# Patient Record
Sex: FEMALE | Race: WHITE | Employment: OTHER | URBAN - METROPOLITAN AREA
[De-identification: names, ages, dates, MRNs, and addresses within clinical notes are randomized per-mention and may not be internally consistent; named-entity substitution may affect disease eponyms.]

---

## 2018-01-15 ENCOUNTER — TELEPHONE (OUTPATIENT)
Dept: FAMILY MEDICINE CLINIC | Facility: CLINIC | Age: 58
End: 2018-01-15

## 2018-01-15 NOTE — TELEPHONE ENCOUNTER
Pt scheduled her Pre-op on 3/6/18 w/Dr Alberta Woodall. Pt having Plastic Surgery w/Dr Alma Soto in [facility] on 3/21/18 (pt to either drop off or fax over orders) CBC,UA and EKG requiried

## 2018-01-22 ENCOUNTER — TELEPHONE (OUTPATIENT)
Dept: FAMILY MEDICINE CLINIC | Facility: CLINIC | Age: 58
End: 2018-01-22

## 2018-01-22 DIAGNOSIS — Z01.818 PRE-OP TESTING: Primary | ICD-10-CM

## 2018-01-22 NOTE — TELEPHONE ENCOUNTER
Patient dropped off requirements needed for her pre-op appointment 03/06/18 with Dr. Jane Boyd, form in triage

## 2018-01-22 NOTE — TELEPHONE ENCOUNTER
Appt made by front staff for Pt. Kal ordered labs, and notified Pt. EKG to be done at appt. Faxed letter sent to front staff to hold for Appt.

## 2018-03-01 LAB
ABSOLUTE BASOPHILS: 43 CELLS/UL (ref 0–200)
ABSOLUTE EOSINOPHILS: 172 CELLS/UL (ref 15–500)
ABSOLUTE LYMPHOCYTES: 2124 CELLS/UL (ref 850–3900)
ABSOLUTE MONOCYTES: 645 CELLS/UL (ref 200–950)
ABSOLUTE NEUTROPHILS: 5616 CELLS/UL (ref 1500–7800)
ALBUMIN/GLOBULIN RATIO: 1.9 (CALC) (ref 1–2.5)
ALBUMIN: 5 G/DL (ref 3.6–5.1)
ALKALINE PHOSPHATASE: 98 U/L (ref 33–130)
ALT: 8 U/L (ref 6–29)
APPEARANCE: CLEAR
AST: 21 U/L (ref 10–35)
BASOPHILS: 0.5 %
BILIRUBIN, TOTAL: 0.5 MG/DL (ref 0.2–1.2)
BILIRUBIN: NEGATIVE
BUN/CREATININE RATIO: 17 (CALC) (ref 6–22)
BUN: 19 MG/DL (ref 7–25)
CALCIUM: 10.4 MG/DL (ref 8.6–10.4)
CARBON DIOXIDE: 27 MMOL/L (ref 20–31)
CHLORIDE: 101 MMOL/L (ref 98–110)
CREATININE: 1.15 MG/DL (ref 0.5–1.05)
EGFR IF AFRICN AM: 61 ML/MIN/1.73M2
EGFR IF NONAFRICN AM: 52 ML/MIN/1.73M2
EOSINOPHILS: 2 %
GLOBULIN: 2.6 G/DL (CALC) (ref 1.9–3.7)
GLUCOSE: 84 MG/DL (ref 65–99)
GLUCOSE: NEGATIVE
HEMATOCRIT: 39.6 % (ref 35–45)
HEMOGLOBIN: 13.5 G/DL (ref 11.7–15.5)
LYMPHOCYTES: 24.7 %
MCH: 31 PG (ref 27–33)
MCHC: 34.1 G/DL (ref 32–36)
MCV: 91 FL (ref 80–100)
MONOCYTES: 7.5 %
MPV: 10.4 FL (ref 7.5–12.5)
NEUTROPHILS: 65.3 %
NITRITE: NEGATIVE
OCCULT BLOOD: NEGATIVE
PH: 6 (ref 5–8)
PLATELET COUNT: 380 THOUSAND/UL (ref 140–400)
POTASSIUM: 4.7 MMOL/L (ref 3.5–5.3)
PROTEIN, TOTAL: 7.6 G/DL (ref 6.1–8.1)
PROTEIN: NEGATIVE
RDW: 12.3 % (ref 11–15)
RED BLOOD CELL COUNT: 4.35 MILLION/UL (ref 3.8–5.1)
SODIUM: 137 MMOL/L (ref 135–146)
SPECIFIC GRAVITY: 1.01 (ref 1–1.03)
WHITE BLOOD CELL COUNT: 8.6 THOUSAND/UL (ref 3.8–10.8)

## 2018-03-06 ENCOUNTER — OFFICE VISIT (OUTPATIENT)
Dept: FAMILY MEDICINE CLINIC | Facility: CLINIC | Age: 58
End: 2018-03-06

## 2018-03-06 VITALS
WEIGHT: 162 LBS | HEART RATE: 60 BPM | RESPIRATION RATE: 16 BRPM | HEIGHT: 65.5 IN | BODY MASS INDEX: 26.67 KG/M2 | DIASTOLIC BLOOD PRESSURE: 70 MMHG | SYSTOLIC BLOOD PRESSURE: 110 MMHG

## 2018-03-06 DIAGNOSIS — R79.89 ELEVATED SERUM CREATININE: ICD-10-CM

## 2018-03-06 DIAGNOSIS — L65.9 HAIR LOSS: ICD-10-CM

## 2018-03-06 DIAGNOSIS — Z01.818 PREOP EXAMINATION: Primary | ICD-10-CM

## 2018-03-06 DIAGNOSIS — Z01.810 PREOP CARDIOVASCULAR EXAM: ICD-10-CM

## 2018-03-06 PROCEDURE — 99214 OFFICE O/P EST MOD 30 MIN: CPT | Performed by: FAMILY MEDICINE

## 2018-03-06 PROCEDURE — 93000 ELECTROCARDIOGRAM COMPLETE: CPT | Performed by: FAMILY MEDICINE

## 2018-03-06 RX ORDER — FLUTICASONE PROPIONATE 50 MCG
SPRAY, SUSPENSION (ML) NASAL
COMMUNITY
Start: 2018-02-20

## 2018-03-06 RX ORDER — BETAMETHASONE DIPROPIONATE 0.5 MG/ML
LOTION, AUGMENTED TOPICAL
Refills: 0 | COMMUNITY
Start: 2018-01-11

## 2018-03-06 NOTE — PROGRESS NOTES
Kasey Babb is a 62year old female who presents for a pre-operative physical exam. Patient is to have facial plastic surgery, to be done by Dr. Nic Wick in [facility] on 3/21/18. Pt has had previous anesthesia:  Yes.   Previous complicatio Heart Disease Paternal Grandfather    • Cancer Mother    • Heart Disease Paternal Grandmother    • Diabetes Father      DM   • High Cholesterol Father    • High Cholesterol Brother    • Stroke Paternal Uncle       Social History:   Smoking status: Never Sm

## 2018-08-02 ENCOUNTER — APPOINTMENT (OUTPATIENT)
Dept: GENERAL RADIOLOGY | Age: 58
End: 2018-08-02
Attending: PHYSICIAN ASSISTANT
Payer: COMMERCIAL

## 2018-08-02 ENCOUNTER — HOSPITAL ENCOUNTER (OUTPATIENT)
Age: 58
Discharge: HOME | End: 2018-08-02
Payer: COMMERCIAL

## 2018-08-02 VITALS
SYSTOLIC BLOOD PRESSURE: 125 MMHG | TEMPERATURE: 98 F | RESPIRATION RATE: 18 BRPM | HEART RATE: 68 BPM | DIASTOLIC BLOOD PRESSURE: 72 MMHG | OXYGEN SATURATION: 100 %

## 2018-08-02 DIAGNOSIS — R14.0 GASSINESS: Primary | ICD-10-CM

## 2018-08-02 LAB
BUN: 14 MG/DL (ref 7–25)
CALCIUM: 9.9 MG/DL (ref 8.6–10.4)
CARBON DIOXIDE: 27 MMOL/L (ref 20–31)
CHLORIDE: 100 MMOL/L (ref 98–110)
CREATININE: 0.89 MG/DL (ref 0.5–1.05)
EGFR IF AFRICN AM: 83 ML/MIN/1.73M2
EGFR IF NONAFRICN AM: 71 ML/MIN/1.73M2
GLUCOSE: 86 MG/DL (ref 65–99)
POCT BILIRUBIN URINE: NEGATIVE
POCT BLOOD URINE: NEGATIVE
POCT GLUCOSE URINE: NEGATIVE MG/DL
POCT KETONE URINE: NEGATIVE MG/DL
POCT NITRITE URINE: NEGATIVE
POCT PH URINE: 7 (ref 5–8)
POCT PROTEIN URINE: NEGATIVE MG/DL
POCT SPECIFIC GRAVITY URINE: 1.01
POCT URINE CLARITY: CLEAR
POCT URINE COLOR: YELLOW
POCT UROBILINOGEN URINE: 0.2 MG/DL
POTASSIUM: 4.5 MMOL/L (ref 3.5–5.3)
SODIUM: 136 MMOL/L (ref 135–146)

## 2018-08-02 PROCEDURE — 99214 OFFICE O/P EST MOD 30 MIN: CPT

## 2018-08-02 PROCEDURE — 99204 OFFICE O/P NEW MOD 45 MIN: CPT

## 2018-08-02 PROCEDURE — 74018 RADEX ABDOMEN 1 VIEW: CPT | Performed by: PHYSICIAN ASSISTANT

## 2018-08-02 PROCEDURE — 87086 URINE CULTURE/COLONY COUNT: CPT | Performed by: PHYSICIAN ASSISTANT

## 2018-08-02 PROCEDURE — 81002 URINALYSIS NONAUTO W/O SCOPE: CPT | Performed by: PHYSICIAN ASSISTANT

## 2018-08-02 RX ORDER — SIMETHICONE 125 MG
125 TABLET,CHEWABLE ORAL EVERY 6 HOURS PRN
Qty: 30 TABLET | Refills: 0 | Status: SHIPPED | OUTPATIENT
Start: 2018-08-02 | End: 2021-06-16

## 2018-08-02 NOTE — ED INITIAL ASSESSMENT (HPI)
C/O diffuse abdominal pain that started about 2 weeks ago. Sts she feels very gassy. Denies pain, just feels full. Last BM was yesterday and normal for her.

## 2018-08-03 NOTE — ED PROVIDER NOTES
Patient Seen in: [facility]    History   Patient presents with:  Abdominal Pain    Stated Complaint: ABD PAIN     HPI    CHIEF COMPLAINT: Gassiness     HISTORY OF PRESENT ILLNESS: Patient is a 19-year-old female with no signific history. Family history reviewed and is not pertinent to presenting problem.     Smoking status: Never Smoker                                                              Smokeless tobacco: Never Used                      Alcohol use: Yes           0.0 o following:        Result Value    Leukocyte esterase urine Small (*)     All other components within normal limits   URINE CULTURE, ROUTINE     Xr Abdomen (kub) (1 Ap View)  (cpt=74018)    Result Date: 8/2/2018  PROCEDURE:  XR ABDOMEN (KUB) (1 AP VIEW)  (C 26950  [phone]    In 1 week          Medications Prescribed:  Discharge Medication List as of 8/2/2018  6:37 PM    START taking these medications    simethicone 125 MG Oral Chew Tab  Chew 1 tablet (125 mg total) by mouth every 6 (six) hours as needed

## 2018-08-15 ENCOUNTER — HOSPITAL ENCOUNTER (OUTPATIENT)
Dept: BONE DENSITY | Age: 58
Discharge: HOME | End: 2018-08-15
Attending: OBSTETRICS & GYNECOLOGY
Payer: COMMERCIAL

## 2018-08-15 ENCOUNTER — HOSPITAL ENCOUNTER (OUTPATIENT)
Dept: MAMMOGRAPHY | Age: 58
Discharge: HOME | End: 2018-08-15
Attending: OBSTETRICS & GYNECOLOGY
Payer: COMMERCIAL

## 2018-08-15 DIAGNOSIS — Z12.31 ENCOUNTER FOR SCREENING MAMMOGRAM FOR MALIGNANT NEOPLASM OF BREAST: ICD-10-CM

## 2018-08-15 DIAGNOSIS — N95.1 SYMPTOMATIC MENOPAUSAL OR FEMALE CLIMACTERIC STATES: ICD-10-CM

## 2018-08-15 PROCEDURE — 77080 DXA BONE DENSITY AXIAL: CPT | Performed by: OBSTETRICS & GYNECOLOGY

## 2018-08-15 PROCEDURE — 77067 SCR MAMMO BI INCL CAD: CPT | Performed by: OBSTETRICS & GYNECOLOGY

## 2018-08-15 PROCEDURE — 77063 BREAST TOMOSYNTHESIS BI: CPT | Performed by: OBSTETRICS & GYNECOLOGY

## 2018-08-20 ENCOUNTER — OFFICE VISIT (OUTPATIENT)
Dept: FAMILY MEDICINE CLINIC | Facility: CLINIC | Age: 58
End: 2018-08-20
Payer: COMMERCIAL

## 2018-08-20 VITALS
HEIGHT: 66 IN | TEMPERATURE: 99 F | HEART RATE: 72 BPM | BODY MASS INDEX: 24.59 KG/M2 | RESPIRATION RATE: 16 BRPM | SYSTOLIC BLOOD PRESSURE: 96 MMHG | WEIGHT: 153 LBS | DIASTOLIC BLOOD PRESSURE: 60 MMHG

## 2018-08-20 DIAGNOSIS — R10.84 GENERALIZED ABDOMINAL PAIN: Primary | ICD-10-CM

## 2018-08-20 DIAGNOSIS — R79.89 ELEVATED SERUM CREATININE: ICD-10-CM

## 2018-08-20 DIAGNOSIS — R21 RASH: ICD-10-CM

## 2018-08-20 PROCEDURE — 99214 OFFICE O/P EST MOD 30 MIN: CPT | Performed by: FAMILY MEDICINE

## 2018-08-20 NOTE — PROGRESS NOTES
Eveline Bee is a 62year old female. HPI:   Went to [facility] with abdomen pain. Had normal UA and xr. Was given simethicone. Stopped taking her supplements 10 days ago. She is feeling a little better. Rash on chest x 1 week.  Changed soaps recen

## 2019-10-16 ENCOUNTER — HOSPITAL ENCOUNTER (OUTPATIENT)
Age: 59
Discharge: HOME | End: 2019-10-16
Payer: COMMERCIAL

## 2019-10-16 VITALS
OXYGEN SATURATION: 97 % | TEMPERATURE: 98 F | SYSTOLIC BLOOD PRESSURE: 113 MMHG | HEART RATE: 68 BPM | DIASTOLIC BLOOD PRESSURE: 60 MMHG | RESPIRATION RATE: 16 BRPM

## 2019-10-16 DIAGNOSIS — T22.211A: Primary | ICD-10-CM

## 2019-10-16 PROCEDURE — 99213 OFFICE O/P EST LOW 20 MIN: CPT

## 2019-10-16 PROCEDURE — 16020 DRESS/DEBRID P-THICK BURN S: CPT

## 2019-10-16 PROCEDURE — 90471 IMMUNIZATION ADMIN: CPT

## 2019-10-16 PROCEDURE — 16000 INITIAL TREATMENT OF BURN(S): CPT

## 2019-10-16 RX ORDER — OMEGA-3-ACID ETHYL ESTERS 1 G/1
1 CAPSULE, LIQUID FILLED ORAL DAILY
COMMUNITY
End: 2021-03-08

## 2019-10-16 RX ORDER — MINOXIDIL 2.5 MG/1
2.5 TABLET ORAL DAILY
COMMUNITY
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
## 2020-01-13 ENCOUNTER — HOSPITAL ENCOUNTER (OUTPATIENT)
Age: 60
Discharge: HOME | End: 2020-01-13
Attending: EMERGENCY MEDICINE
Payer: COMMERCIAL

## 2020-01-13 VITALS
RESPIRATION RATE: 20 BRPM | BODY MASS INDEX: 25.83 KG/M2 | DIASTOLIC BLOOD PRESSURE: 62 MMHG | WEIGHT: 155 LBS | HEIGHT: 65 IN | SYSTOLIC BLOOD PRESSURE: 126 MMHG | TEMPERATURE: 99 F | HEART RATE: 73 BPM | OXYGEN SATURATION: 97 %

## 2020-01-13 DIAGNOSIS — J01.00 ACUTE MAXILLARY SINUSITIS, RECURRENCE NOT SPECIFIED: Primary | ICD-10-CM

## 2020-01-13 PROCEDURE — 99214 OFFICE O/P EST MOD 30 MIN: CPT

## 2020-01-13 PROCEDURE — 99213 OFFICE O/P EST LOW 20 MIN: CPT

## 2020-01-13 RX ORDER — FLUTICASONE PROPIONATE 50 MCG
2 SPRAY, SUSPENSION (ML) NASAL DAILY
Qty: 16 G | Refills: 0 | Status: SHIPPED | OUTPATIENT
Start: 2020-01-13 | End: 2020-02-12

## 2020-01-13 RX ORDER — GUAIFENESIN 600 MG
1200 TABLET, EXTENDED RELEASE 12 HR ORAL 2 TIMES DAILY
Qty: 20 TABLET | Refills: 0 | Status: SHIPPED | OUTPATIENT
Start: 2020-01-13 | End: 2021-03-08

## 2020-01-13 RX ORDER — AMOXICILLIN AND CLAVULANATE POTASSIUM 875; 125 MG/1; MG/1
1 TABLET, FILM COATED ORAL 2 TIMES DAILY
Qty: 20 TABLET | Refills: 0 | Status: SHIPPED | OUTPATIENT
Start: 2020-01-13 | End: 2020-01-23

## 2020-01-13 NOTE — ED PROVIDER NOTES
Patient Seen in: [facility]      History   Patient presents with:  Cough/URI    Stated Complaint: c/o head cold for 3 wks    HPI    Head cold with congestion and she cannot blow out her nose for the last 3 weeks, she states it b are clear to auscultation heart has regular rate and rhythm    ED Course   Labs Reviewed - No data to display           MDM     Patient presents to the emergency department with 3 weeks of sinus congestion, being unable to blow her nose, I asked her to sto

## 2021-03-01 ENCOUNTER — HOSPITAL ENCOUNTER (OUTPATIENT)
Age: 61
Discharge: HOME | End: 2021-03-01
Attending: EMERGENCY MEDICINE
Payer: COMMERCIAL

## 2021-03-01 ENCOUNTER — APPOINTMENT (OUTPATIENT)
Dept: CT IMAGING | Age: 61
End: 2021-03-01
Attending: PHYSICIAN ASSISTANT
Payer: COMMERCIAL

## 2021-03-01 VITALS
TEMPERATURE: 97 F | SYSTOLIC BLOOD PRESSURE: 142 MMHG | HEART RATE: 82 BPM | OXYGEN SATURATION: 98 % | DIASTOLIC BLOOD PRESSURE: 75 MMHG | RESPIRATION RATE: 20 BRPM

## 2021-03-01 DIAGNOSIS — R10.9 ABDOMINAL PAIN, ACUTE: Primary | ICD-10-CM

## 2021-03-01 DIAGNOSIS — R93.2 ABNORMAL CT OF LIVER: ICD-10-CM

## 2021-03-01 LAB
#MXD IC: 0.5 X10ˆ3/UL (ref 0.1–1)
ALBUMIN SERPL-MCNC: 4.3 G/DL (ref 3.4–5)
ALP LIVER SERPL-CCNC: 136 U/L
ALT SERPL-CCNC: 19 U/L
AST SERPL-CCNC: 24 U/L (ref 15–37)
BILIRUB DIRECT SERPL-MCNC: 0.1 MG/DL (ref 0–0.2)
BILIRUB SERPL-MCNC: 0.5 MG/DL (ref 0.1–2)
CREAT BLD-MCNC: 0.9 MG/DL
GLUCOSE BLD-MCNC: 97 MG/DL (ref 70–99)
HCT VFR BLD AUTO: 43 %
HGB BLD-MCNC: 14.1 G/DL
ISTAT BUN: 20 MG/DL (ref 7–18)
ISTAT CHLORIDE: 102 MMOL/L (ref 98–112)
ISTAT HEMATOCRIT: 44 %
ISTAT IONIZED CALCIUM FOR CHEM 8: 1.18 MMOL/L (ref 1.12–1.32)
ISTAT POTASSIUM: 3.9 MMOL/L (ref 3.6–5.1)
ISTAT SODIUM: 138 MMOL/L (ref 136–145)
ISTAT TCO2: 27 MMOL/L (ref 21–32)
LIPASE SERPL-CCNC: 117 U/L (ref 73–393)
LYMPHOCYTES # BLD AUTO: 1.8 X10ˆ3/UL (ref 1–4)
LYMPHOCYTES NFR BLD AUTO: 26.7 %
M PROTEIN MFR SERPL ELPH: 8.2 G/DL (ref 6.4–8.2)
MCH RBC QN AUTO: 29.3 PG (ref 26–34)
MCHC RBC AUTO-ENTMCNC: 32.8 G/DL (ref 31–37)
MCV RBC AUTO: 89.2 FL (ref 80–100)
MIXED CELL %: 6.9 %
NEUTROPHILS # BLD AUTO: 4.3 X10ˆ3/UL (ref 1.5–7.7)
NEUTROPHILS NFR BLD AUTO: 66.4 %
PLATELET # BLD AUTO: 389 X10ˆ3/UL (ref 150–450)
POCT BILIRUBIN URINE: NEGATIVE
POCT BLOOD URINE: NEGATIVE
POCT GLUCOSE URINE: NEGATIVE MG/DL
POCT KETONE URINE: NEGATIVE MG/DL
POCT LEUKOCYTE ESTERASE URINE: NEGATIVE
POCT NITRITE URINE: NEGATIVE
POCT PH URINE: 6 (ref 5–8)
POCT PROTEIN URINE: NEGATIVE MG/DL
POCT SPECIFIC GRAVITY URINE: 1.02
POCT URINE CLARITY: CLEAR
POCT URINE COLOR: YELLOW
POCT UROBILINOGEN URINE: 0.2 MG/DL
RBC # BLD AUTO: 4.82 X10ˆ6/UL
WBC # BLD AUTO: 6.6 X10ˆ3/UL (ref 4–11)

## 2021-03-01 PROCEDURE — 85025 COMPLETE CBC W/AUTO DIFF WBC: CPT | Performed by: PHYSICIAN ASSISTANT

## 2021-03-01 PROCEDURE — 74177 CT ABD & PELVIS W/CONTRAST: CPT | Performed by: PHYSICIAN ASSISTANT

## 2021-03-01 PROCEDURE — 81002 URINALYSIS NONAUTO W/O SCOPE: CPT | Performed by: PHYSICIAN ASSISTANT

## 2021-03-01 PROCEDURE — 99215 OFFICE O/P EST HI 40 MIN: CPT

## 2021-03-01 PROCEDURE — 99214 OFFICE O/P EST MOD 30 MIN: CPT

## 2021-03-01 PROCEDURE — 36415 COLL VENOUS BLD VENIPUNCTURE: CPT

## 2021-03-01 PROCEDURE — 80047 BASIC METABLC PNL IONIZED CA: CPT

## 2021-03-01 PROCEDURE — 80076 HEPATIC FUNCTION PANEL: CPT | Performed by: PHYSICIAN ASSISTANT

## 2021-03-01 PROCEDURE — 83690 ASSAY OF LIPASE: CPT | Performed by: PHYSICIAN ASSISTANT

## 2021-03-01 RX ORDER — SIMETHICONE 125 MG
1 CAPSULE ORAL EVERY 6 HOURS PRN
Qty: 20 CAPSULE | Refills: 0 | Status: SHIPPED | OUTPATIENT
Start: 2021-03-01 | End: 2021-03-08

## 2021-03-01 NOTE — ED PROVIDER NOTES
Patient Seen in: [facility]      History   Patient presents with:  Abdominal Pain: Digestive issues - Entered by patient    Stated Complaint: Abdominal Pain - Digestive issues 1 wk    HPI/Subjective:   HPI    59-year-old female with a his sounds. Abdominal:      Palpations: Abdomen is soft. Tenderness: There is no abdominal tenderness. There is no guarding or rebound. Negative signs include Loco's sign and McBurney's sign. Musculoskeletal: Normal range of motion.    Skin:     Gene dissection. RETROPERITONEUM:  No mass or adenopathy. BOWEL/MESENTERY:  There is diverticulosis without evidence of acute diverticulitis. The appendix is normal. ABDOMINAL WALL:  No mass or hernia.   URINARY BLADDER:  No visible focal wall thickening, les testing methodology and agreed on plan of care.                      Disposition and Plan     Clinical Impression:  Abdominal pain, acute  (primary encounter diagnosis)  Abnormal CT of liver    Disposition:  Discharge  3/1/2021  7:31 pm    Follow-up:  [facility] Regular rate and rhythm, Heart sounds S1 S2 present, no murmurs, rubs or gallops

## 2021-03-01 NOTE — ED INITIAL ASSESSMENT (HPI)
Pt states she started feeling a \"knot\" to mid abd. Dull intermittent pain. Loose stools. Denies any n/v. Denies pain on palpation. Denies any abd pain at this time. Onset of symptoms started a week ago.    Diet is normal.

## 2021-03-02 NOTE — ED PROVIDER NOTES
Case discussed, agree with plan.   Recommend close follow-up with her primary care doctor and/or GI specialist.  Symptomatic treatment with light diet, plenty of fluids, over-the-counter Gas-X

## 2021-03-08 ENCOUNTER — OFFICE VISIT (OUTPATIENT)
Dept: FAMILY MEDICINE CLINIC | Facility: CLINIC | Age: 61
End: 2021-03-08
Payer: COMMERCIAL

## 2021-03-08 ENCOUNTER — TELEPHONE (OUTPATIENT)
Dept: FAMILY MEDICINE CLINIC | Facility: CLINIC | Age: 61
End: 2021-03-08

## 2021-03-08 VITALS
RESPIRATION RATE: 16 BRPM | TEMPERATURE: 97 F | DIASTOLIC BLOOD PRESSURE: 66 MMHG | BODY MASS INDEX: 29.62 KG/M2 | HEIGHT: 65 IN | WEIGHT: 177.81 LBS | SYSTOLIC BLOOD PRESSURE: 108 MMHG | HEART RATE: 94 BPM

## 2021-03-08 DIAGNOSIS — K76.9 LIVER LESION: Primary | ICD-10-CM

## 2021-03-08 DIAGNOSIS — R10.84 GENERALIZED ABDOMINAL PAIN: Primary | ICD-10-CM

## 2021-03-08 DIAGNOSIS — R14.0 ABDOMINAL BLOATING: ICD-10-CM

## 2021-03-08 DIAGNOSIS — K76.9 LIVER LESION: ICD-10-CM

## 2021-03-08 PROCEDURE — 3078F DIAST BP <80 MM HG: CPT | Performed by: FAMILY MEDICINE

## 2021-03-08 PROCEDURE — 99214 OFFICE O/P EST MOD 30 MIN: CPT | Performed by: FAMILY MEDICINE

## 2021-03-08 PROCEDURE — 3008F BODY MASS INDEX DOCD: CPT | Performed by: FAMILY MEDICINE

## 2021-03-08 PROCEDURE — 3074F SYST BP LT 130 MM HG: CPT | Performed by: FAMILY MEDICINE

## 2021-03-08 RX ORDER — OMEPRAZOLE 40 MG/1
40 CAPSULE, DELAYED RELEASE ORAL DAILY
Qty: 30 CAPSULE | Refills: 0 | Status: SHIPPED | OUTPATIENT
Start: 2021-03-08 | End: 2021-10-05

## 2021-03-08 NOTE — PROGRESS NOTES
Ruth Ann Cerrato is a 64year old female. HPI:   Patient went to [address] [place] [address] with abdominal pain on 3/1/21. Has had sx for 2 weeks. Worse in AM, better as day goes on  Increase gas/bloating. Passing a little flatus, minimal belching. More stress.   Hasn't auscultation  CARDIO: RRR without murmur  AB:  Soft, NT, ND, neg murphys    ASSESSMENT AND PLAN:   1. Generalized abdominal pain  2. Abdominal bloating  3. Liver lesion    Begin Omeprazole  Low fat, low carb diet. Can continue probiotic.    Plan for f/u MR

## 2021-03-08 NOTE — TELEPHONE ENCOUNTER
Pt had abnormal CT at immediate care.       Low-density in the lateral and medial segments of the left right lobe adjacent to the false form ligament may be due to mildly dilated biliary ducts in this region.  The common bile duct is unremarkable.  Possibil

## 2021-03-08 NOTE — TELEPHONE ENCOUNTER
Called Radiology Dept. MRI abdomen & MRCP with and without contrast recommended. Write \"include liver\" in comments.     Routed to Dr. Duarte Shishmaref - pended for review

## 2021-03-10 ENCOUNTER — TELEPHONE (OUTPATIENT)
Dept: FAMILY MEDICINE CLINIC | Facility: CLINIC | Age: 61
End: 2021-03-10

## 2021-03-10 ENCOUNTER — APPOINTMENT (OUTPATIENT)
Dept: CT IMAGING | Facility: HOSPITAL | Age: 61
End: 2021-03-10
Attending: STUDENT IN AN ORGANIZED HEALTH CARE EDUCATION/TRAINING PROGRAM
Payer: COMMERCIAL

## 2021-03-10 ENCOUNTER — HOSPITAL ENCOUNTER (EMERGENCY)
Facility: HOSPITAL | Age: 61
Discharge: HOME | End: 2021-03-10
Attending: STUDENT IN AN ORGANIZED HEALTH CARE EDUCATION/TRAINING PROGRAM
Payer: COMMERCIAL

## 2021-03-10 VITALS
RESPIRATION RATE: 21 BRPM | TEMPERATURE: 98 F | HEIGHT: 66 IN | DIASTOLIC BLOOD PRESSURE: 68 MMHG | SYSTOLIC BLOOD PRESSURE: 115 MMHG | OXYGEN SATURATION: 97 % | WEIGHT: 170 LBS | BODY MASS INDEX: 27.32 KG/M2 | HEART RATE: 63 BPM

## 2021-03-10 DIAGNOSIS — R06.00 DYSPNEA, UNSPECIFIED TYPE: ICD-10-CM

## 2021-03-10 DIAGNOSIS — D72.829 LEUKOCYTOSIS, UNSPECIFIED TYPE: ICD-10-CM

## 2021-03-10 DIAGNOSIS — E86.0 DEHYDRATION: ICD-10-CM

## 2021-03-10 DIAGNOSIS — R10.9 ABDOMINAL PAIN, ACUTE: Primary | ICD-10-CM

## 2021-03-10 LAB
ALBUMIN SERPL-MCNC: 4.4 G/DL (ref 3.4–5)
ALBUMIN/GLOB SERPL: 1 {RATIO} (ref 1–2)
ALP LIVER SERPL-CCNC: 155 U/L
ALT SERPL-CCNC: 16 U/L
ANION GAP SERPL CALC-SCNC: 9 MMOL/L (ref 0–18)
AST SERPL-CCNC: 21 U/L (ref 15–37)
ATRIAL RATE: 77 BPM
BASOPHILS # BLD AUTO: 0.04 X10(3) UL (ref 0–0.2)
BASOPHILS NFR BLD AUTO: 0.3 %
BILIRUB SERPL-MCNC: 0.7 MG/DL (ref 0.1–2)
BILIRUB UR QL STRIP.AUTO: NEGATIVE
BUN BLD-MCNC: 19 MG/DL (ref 7–18)
BUN/CREAT SERPL: 17.3 (ref 10–20)
CALCIUM BLD-MCNC: 10.1 MG/DL (ref 8.5–10.1)
CHLORIDE SERPL-SCNC: 106 MMOL/L (ref 98–112)
CLARITY UR REFRACT.AUTO: CLEAR
CO2 SERPL-SCNC: 23 MMOL/L (ref 21–32)
CREAT BLD-MCNC: 1.1 MG/DL
D-DIMER: 1.02 UG/ML FEU (ref ?–0.61)
DEPRECATED RDW RBC AUTO: 38.8 FL (ref 35.1–46.3)
EOSINOPHIL # BLD AUTO: 0.06 X10(3) UL (ref 0–0.7)
EOSINOPHIL NFR BLD AUTO: 0.5 %
ERYTHROCYTE [DISTWIDTH] IN BLOOD BY AUTOMATED COUNT: 12.7 % (ref 11–15)
GLOBULIN PLAS-MCNC: 4.3 G/DL (ref 2.8–4.4)
GLUCOSE BLD-MCNC: 108 MG/DL (ref 70–99)
GLUCOSE UR STRIP.AUTO-MCNC: NEGATIVE MG/DL
HCT VFR BLD AUTO: 44.2 %
HGB BLD-MCNC: 15.6 G/DL
IMM GRANULOCYTES # BLD AUTO: 0.05 X10(3) UL (ref 0–1)
IMM GRANULOCYTES NFR BLD: 0.4 %
KETONES UR STRIP.AUTO-MCNC: NEGATIVE MG/DL
LIPASE SERPL-CCNC: 142 U/L (ref 73–393)
LYMPHOCYTES # BLD AUTO: 1.41 X10(3) UL (ref 1–4)
LYMPHOCYTES NFR BLD AUTO: 11.1 %
M PROTEIN MFR SERPL ELPH: 8.7 G/DL (ref 6.4–8.2)
MCH RBC QN AUTO: 29.9 PG (ref 26–34)
MCHC RBC AUTO-ENTMCNC: 35.3 G/DL (ref 31–37)
MCV RBC AUTO: 84.8 FL
MONOCYTES # BLD AUTO: 0.58 X10(3) UL (ref 0.1–1)
MONOCYTES NFR BLD AUTO: 4.5 %
NEUTROPHILS # BLD AUTO: 10.61 X10 (3) UL (ref 1.5–7.7)
NEUTROPHILS # BLD AUTO: 10.61 X10(3) UL (ref 1.5–7.7)
NEUTROPHILS NFR BLD AUTO: 83.2 %
NITRITE UR QL STRIP.AUTO: NEGATIVE
OSMOLALITY SERPL CALC.SUM OF ELEC: 289 MOSM/KG (ref 275–295)
P AXIS: 59 DEGREES
P-R INTERVAL: 168 MS
PH UR STRIP.AUTO: 7 [PH] (ref 5–8)
PLATELET # BLD AUTO: 393 10(3)UL (ref 150–450)
POTASSIUM SERPL-SCNC: 3.6 MMOL/L (ref 3.5–5.1)
PROT UR STRIP.AUTO-MCNC: NEGATIVE MG/DL
Q-T INTERVAL: 370 MS
QRS DURATION: 78 MS
QTC CALCULATION (BEZET): 418 MS
R AXIS: -4 DEGREES
RBC # BLD AUTO: 5.21 X10(6)UL
RBC UR QL AUTO: NEGATIVE
SODIUM SERPL-SCNC: 138 MMOL/L (ref 136–145)
SP GR UR STRIP.AUTO: 1 (ref 1–1.03)
T AXIS: 43 DEGREES
TROPONIN I SERPL-MCNC: <0.045 NG/ML (ref ?–0.04)
UROBILINOGEN UR STRIP.AUTO-MCNC: <2 MG/DL
VENTRICULAR RATE: 77 BPM
WBC # BLD AUTO: 12.8 X10(3) UL (ref 4–11)

## 2021-03-10 PROCEDURE — 99285 EMERGENCY DEPT VISIT HI MDM: CPT

## 2021-03-10 PROCEDURE — 81001 URINALYSIS AUTO W/SCOPE: CPT | Performed by: STUDENT IN AN ORGANIZED HEALTH CARE EDUCATION/TRAINING PROGRAM

## 2021-03-10 PROCEDURE — 93010 ELECTROCARDIOGRAM REPORT: CPT

## 2021-03-10 PROCEDURE — 84484 ASSAY OF TROPONIN QUANT: CPT | Performed by: STUDENT IN AN ORGANIZED HEALTH CARE EDUCATION/TRAINING PROGRAM

## 2021-03-10 PROCEDURE — 80053 COMPREHEN METABOLIC PANEL: CPT | Performed by: STUDENT IN AN ORGANIZED HEALTH CARE EDUCATION/TRAINING PROGRAM

## 2021-03-10 PROCEDURE — 93005 ELECTROCARDIOGRAM TRACING: CPT

## 2021-03-10 PROCEDURE — 85025 COMPLETE CBC W/AUTO DIFF WBC: CPT | Performed by: STUDENT IN AN ORGANIZED HEALTH CARE EDUCATION/TRAINING PROGRAM

## 2021-03-10 PROCEDURE — 85379 FIBRIN DEGRADATION QUANT: CPT | Performed by: STUDENT IN AN ORGANIZED HEALTH CARE EDUCATION/TRAINING PROGRAM

## 2021-03-10 PROCEDURE — 71275 CT ANGIOGRAPHY CHEST: CPT | Performed by: STUDENT IN AN ORGANIZED HEALTH CARE EDUCATION/TRAINING PROGRAM

## 2021-03-10 PROCEDURE — 83690 ASSAY OF LIPASE: CPT | Performed by: STUDENT IN AN ORGANIZED HEALTH CARE EDUCATION/TRAINING PROGRAM

## 2021-03-10 PROCEDURE — 36415 COLL VENOUS BLD VENIPUNCTURE: CPT

## 2021-03-10 NOTE — ED INITIAL ASSESSMENT (HPI)
Pt c/o lower mid abdominal discomfort and gas. Pt went to IC and had CT done. Pts PCP wants pt to have a special test that is pending with insurance. Pt denies N/V/D. Pt also c/o some exertional SOB, denies cp.

## 2021-03-10 NOTE — ED PROVIDER NOTES
Patient Seen in: [facility] Emergency Department      History   Patient presents with:  Abdomen/Flank Pain    Stated Complaint: abd pain    HPI/Subjective:   HPI    Patient is a 51-year-old female who presents emergency department with abdominal dis 98.3 °F (36.8 °C)   Temp src Temporal   SpO2 95 %   O2 Device None (Room air)       Current:/68   Pulse 63   Temp 98.3 °F (36.8 °C) (Temporal)   Resp 21   Ht 167.6 cm (5' 6\")   Wt 77.1 kg   SpO2 97%   BMI 27.44 kg/m²         Physical Exam    Constit I - Normal   CBC WITH DIFFERENTIAL WITH PLATELET    Narrative: The following orders were created for panel order CBC WITH DIFFERENTIAL WITH PLATELET.   Procedure                               Abnormality         Status                     --------- received a phone call and was able to schedule her MRI/MRCP for tomorrow. Repeat abdominal examination reveals soft and nontender abdomen. I feel the patient can safely follow-up with her primary care physician.   She was advised on reasons to return to

## 2021-03-10 NOTE — TELEPHONE ENCOUNTER
Ultra fast heart scan [phone], called patient is currently in the ED for shortness of breath and will look into this when she gets out.

## 2021-03-11 ENCOUNTER — HOSPITAL ENCOUNTER (OUTPATIENT)
Dept: MRI IMAGING | Age: 61
Discharge: HOME | End: 2021-03-11
Attending: FAMILY MEDICINE
Payer: COMMERCIAL

## 2021-03-11 DIAGNOSIS — K76.9 LIVER LESION: ICD-10-CM

## 2021-03-11 PROCEDURE — 76376 3D RENDER W/INTRP POSTPROCES: CPT | Performed by: FAMILY MEDICINE

## 2021-03-11 PROCEDURE — A9581 GADOXETATE DISODIUM INJ: HCPCS | Performed by: FAMILY MEDICINE

## 2021-03-11 PROCEDURE — 74183 MRI ABD W/O CNTR FLWD CNTR: CPT | Performed by: FAMILY MEDICINE

## 2021-03-12 ENCOUNTER — TELEPHONE (OUTPATIENT)
Dept: FAMILY MEDICINE CLINIC | Facility: CLINIC | Age: 61
End: 2021-03-12

## 2021-03-12 NOTE — TELEPHONE ENCOUNTER
Reviewed MRI:  Benign appearing cyst in the liver  Atrophy (decrease in size) in an area of the liver with dilated duct. 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       I would li

## 2021-03-12 NOTE — TELEPHONE ENCOUNTER
Called and talked to patient and went over instructions from Dr Sheldon Moreno she will try this and see how she does.

## 2021-03-12 NOTE — TELEPHONE ENCOUNTER
Results reviewed with patient. She verbalizes understanding. She has already scheduled with Dr Roque Kirby. She reports the Omeprazole is not providing any relief and questions whether there is another medication she can try?     Routed to Dr. Courtney Hercules

## 2021-03-12 NOTE — TELEPHONE ENCOUNTER
It can take some time to notice an improvement. She was also given simethicone from the IC that she can use as needed. I also recommend low fat diet;  avoid acid, spicy, fried foods and caffeine and etoh.

## 2021-03-17 ENCOUNTER — HOSPITAL ENCOUNTER (OUTPATIENT)
Dept: MAMMOGRAPHY | Age: 61
Discharge: HOME | End: 2021-03-17
Attending: NURSE PRACTITIONER
Payer: COMMERCIAL

## 2021-03-17 DIAGNOSIS — Z12.31 VISIT FOR SCREENING MAMMOGRAM: ICD-10-CM

## 2021-03-17 PROCEDURE — 77063 BREAST TOMOSYNTHESIS BI: CPT | Performed by: NURSE PRACTITIONER

## 2021-03-17 PROCEDURE — 77067 SCR MAMMO BI INCL CAD: CPT | Performed by: NURSE PRACTITIONER

## 2021-03-19 ENCOUNTER — HOSPITAL ENCOUNTER (EMERGENCY)
Facility: HOSPITAL | Age: 61
Discharge: HOME | End: 2021-03-19
Attending: EMERGENCY MEDICINE
Payer: COMMERCIAL

## 2021-03-19 ENCOUNTER — PATIENT MESSAGE (OUTPATIENT)
Dept: FAMILY MEDICINE CLINIC | Facility: CLINIC | Age: 61
End: 2021-03-19

## 2021-03-19 VITALS
TEMPERATURE: 98 F | HEIGHT: 66 IN | WEIGHT: 165 LBS | DIASTOLIC BLOOD PRESSURE: 62 MMHG | OXYGEN SATURATION: 98 % | RESPIRATION RATE: 23 BRPM | BODY MASS INDEX: 26.52 KG/M2 | HEART RATE: 63 BPM | SYSTOLIC BLOOD PRESSURE: 121 MMHG

## 2021-03-19 DIAGNOSIS — T78.3XXA ANGIOEDEMA OF LIPS, INITIAL ENCOUNTER: Primary | ICD-10-CM

## 2021-03-19 DIAGNOSIS — K76.9 LIVER LESION: ICD-10-CM

## 2021-03-19 DIAGNOSIS — R10.84 GENERALIZED ABDOMINAL PAIN: Primary | ICD-10-CM

## 2021-03-19 PROCEDURE — S0028 INJECTION, FAMOTIDINE, 20 MG: HCPCS

## 2021-03-19 PROCEDURE — 99284 EMERGENCY DEPT VISIT MOD MDM: CPT

## 2021-03-19 PROCEDURE — 96374 THER/PROPH/DIAG INJ IV PUSH: CPT

## 2021-03-19 PROCEDURE — 96375 TX/PRO/DX INJ NEW DRUG ADDON: CPT

## 2021-03-19 PROCEDURE — 96361 HYDRATE IV INFUSION ADD-ON: CPT

## 2021-03-19 RX ORDER — FAMOTIDINE 10 MG/ML
INJECTION, SOLUTION INTRAVENOUS
Status: COMPLETED
Start: 2021-03-19 | End: 2021-03-19

## 2021-03-19 RX ORDER — DIPHENHYDRAMINE HYDROCHLORIDE 50 MG/ML
50 INJECTION INTRAMUSCULAR; INTRAVENOUS ONCE
Status: COMPLETED | OUTPATIENT
Start: 2021-03-19 | End: 2021-03-19

## 2021-03-19 RX ORDER — DIPHENHYDRAMINE HCL 50 MG
50 CAPSULE ORAL ONCE
Status: DISCONTINUED | OUTPATIENT
Start: 2021-03-19 | End: 2021-03-19

## 2021-03-19 RX ORDER — METHYLPREDNISOLONE SODIUM SUCCINATE 125 MG/2ML
125 INJECTION, POWDER, LYOPHILIZED, FOR SOLUTION INTRAMUSCULAR; INTRAVENOUS ONCE
Status: COMPLETED | OUTPATIENT
Start: 2021-03-19 | End: 2021-03-19

## 2021-03-19 RX ORDER — FAMOTIDINE 10 MG/ML
20 INJECTION, SOLUTION INTRAVENOUS ONCE
Status: COMPLETED | OUTPATIENT
Start: 2021-03-19 | End: 2021-03-19

## 2021-03-19 RX ORDER — DIPHENHYDRAMINE HYDROCHLORIDE 50 MG/ML
INJECTION INTRAMUSCULAR; INTRAVENOUS
Status: COMPLETED
Start: 2021-03-19 | End: 2021-03-19

## 2021-03-19 RX ORDER — METHYLPREDNISOLONE SODIUM SUCCINATE 125 MG/2ML
INJECTION, POWDER, LYOPHILIZED, FOR SOLUTION INTRAMUSCULAR; INTRAVENOUS
Status: COMPLETED
Start: 2021-03-19 | End: 2021-03-19

## 2021-03-19 NOTE — TELEPHONE ENCOUNTER
From: Polo Escobar  To: Breanna Aquino MD  Sent: 3/19/2021 3:25 PM CDT  Subject: Referral Request    Jose Luis Hamilton,    I am planning on seeing [facility]. [facility] needs a referral from your office.  [facility]’s phone number is [phone]

## 2021-03-19 NOTE — ED PROVIDER NOTES
Patient Seen in: [facility] Emergency Department      History   Patient presents with:   Allergic Rxn Allergies    Stated Complaint: pt arrives with puffy lips, pt started new medication, no marshall or swollen tongue     HPI/Subjective:   HPI    61-year- equipment including droplet mask, eye protection, and gloves were worn throughout the duration of the exam.  Handwashing was performed prior to and after the exam.  Stethoscope and any equipment used during my examination was cleaned with super sani-cloth life threatening deterioration of the patient's clinical status.   The patient required my time at the bedside performing direct personal management, the absence of which would likely result in sudden, clinically significant or life threatening deterioratio

## 2021-03-19 NOTE — ED INITIAL ASSESSMENT (HPI)
Pt reports taking new medications, last night at 7pm, reports she noticed swelling in her lips around 11pm. Pt states she took benadryl around 2am. No shortness of breath, managing secretions. Noted swollen lips.

## 2021-03-19 NOTE — TELEPHONE ENCOUNTER
Was referred to [facility]. I'm assuming patient choosing to f/u at [facility].   Can forward to Kishore Blake for referral

## 2021-03-19 NOTE — ED NOTES
New medications pt took are,    PepZinGi (Zinc-L-Camosine Complex)  [place] herbal Supplement  DGL - Deglycyrrhizinated Licorice root extract

## 2021-03-19 NOTE — TELEPHONE ENCOUNTER
Do you know anything about Pt needing to see a GI specialist at [facility]?

## 2021-03-22 NOTE — TELEPHONE ENCOUNTER
Referral request [facility]  Fax number: [phone]    Office notes from ER visit 3/10/2021  Patient is a 14-year-old female who presents emergency department with abdominal discomfort of 2 weeks duration and difficulty breathing since yesterday

## 2021-04-22 ENCOUNTER — TELEPHONE (OUTPATIENT)
Dept: FAMILY MEDICINE CLINIC | Facility: CLINIC | Age: 61
End: 2021-04-22

## 2021-04-22 NOTE — TELEPHONE ENCOUNTER
Patient called back I asked her why she would like this done as she just had colonoscopy in 2016 and is not due until 2026. She said it has been 5 years and if she wants to do this who a, I to question her.  I explained that insurance might not cover this b

## 2021-04-22 NOTE — TELEPHONE ENCOUNTER
Called LMJUAN LUIS to call back to see why she wanted to do cologard had colonoscopy 3/07/2016 that said to repeat it in 2026.

## 2021-05-17 ENCOUNTER — TELEPHONE (OUTPATIENT)
Dept: FAMILY MEDICINE CLINIC | Facility: CLINIC | Age: 61
End: 2021-05-17

## 2021-05-17 NOTE — TELEPHONE ENCOUNTER
Pt is calling for her Cologuard test results. She said it has been over a month. Please call to advise.

## 2021-06-16 ENCOUNTER — TELEPHONE (OUTPATIENT)
Dept: FAMILY MEDICINE CLINIC | Facility: CLINIC | Age: 61
End: 2021-06-16

## 2021-06-16 RX ORDER — TOBRAMYCIN AND DEXAMETHASONE 3; 1 MG/ML; MG/ML
SUSPENSION/ DROPS OPHTHALMIC
COMMUNITY
Start: 2021-06-03 | End: 2021-10-05

## 2021-06-16 NOTE — TELEPHONE ENCOUNTER
Had surgery and has bubble patch over eye and need to rest. It will take 1 year for it to completely heal She feels very anxious about this would like something for anxiety.  Made an appointment with Carlee Klinefelter PA for tomorrow at 3 pm.

## 2021-06-16 NOTE — TELEPHONE ENCOUNTER
Patient is requesting Anxiety medication. She just had Retina Surgery 12 days ago and fills like she's needs something ASAP . Patient was very impatience and sounded nervous about when we would get back to her.

## 2021-06-18 ENCOUNTER — TELEPHONE (OUTPATIENT)
Dept: FAMILY MEDICINE CLINIC | Facility: CLINIC | Age: 61
End: 2021-06-18

## 2021-06-18 NOTE — TELEPHONE ENCOUNTER
Medications reviewed with patient. She verbalized understanding. Patient has no further questions or concerns at this time.

## 2021-06-18 NOTE — TELEPHONE ENCOUNTER
Patient has questions about her   Meclizine HCl 25 MG Oral Tab that was given to her yesterday she is requesting a call back ASAP.

## 2021-06-21 ENCOUNTER — TELEPHONE (OUTPATIENT)
Dept: FAMILY MEDICINE CLINIC | Facility: CLINIC | Age: 61
End: 2021-06-21

## 2021-06-21 NOTE — TELEPHONE ENCOUNTER
Pt states Leda Valdes prescribed her Meclizine/ escitalopram/  ALPRAZolam pt states wants to know why they were prescribed to her.

## 2021-06-21 NOTE — TELEPHONE ENCOUNTER
Patient states meclizine is not working for her. It takes several hours after dose to feel any relief. She is asking if there are any alternatives she can try?     Routed to Horace Sainz PA-C

## 2021-06-22 NOTE — PROGRESS NOTES
Patient presents with:   Anxiety: looking for medication for anxiety cause from post retinal surgery   Dizziness: requesting medication for dizziness/light headed        HISTORY OF PRESENT ILLNESS  Codie Domingo is a 64year old female who presents for e Disp: , Rfl:   •  Nutritional Supplements (VITAMIN D BOOSTER OR), Take  by mouth., Disp: , Rfl:   •  Spironolactone 100 MG Oral Tab, Take 100 mg by mouth daily. , Disp: , Rfl:     Patient has no known allergies.     Patient Active Problem List    Unspecified

## 2021-06-24 NOTE — TELEPHONE ENCOUNTER
Pt states no improvement with dizziness- taking the Meclizine daily and will occ take a 2nd dose. Pt would like to know if there is an alternate med that may help more? Pt concerned about the Lexapro and possible side effects.  Pt saw a commercial and t

## 2021-06-24 NOTE — TELEPHONE ENCOUNTER
Tardive dyskinesia is more related to taking numerous medications that affect serotonin. Not a concern for her. Can increase meclizine number of times per day to try taking 2-3 to see if helpful.  OK to use CBD oil if helpful, not a lot of studies so cannot

## 2021-06-24 NOTE — TELEPHONE ENCOUNTER
Patient called back we went over what she wanted to discuss about TD and medications suggested she take meclizine.  3 times a day and wait for medication to start working she feels it might be getting better

## 2021-06-29 ENCOUNTER — TELEPHONE (OUTPATIENT)
Dept: FAMILY MEDICINE CLINIC | Facility: CLINIC | Age: 61
End: 2021-06-29

## 2021-06-29 NOTE — TELEPHONE ENCOUNTER
Pt requesting for a call back Laci Fernandes (nurse) to see if she can take a supplement Lutin. No further information given.

## 2021-06-29 NOTE — TELEPHONE ENCOUNTER
Patient would like to take Lutein for eye health. Any objections?     Routed to Dr. Cristina Flaherty

## 2021-07-02 ENCOUNTER — TELEPHONE (OUTPATIENT)
Dept: FAMILY MEDICINE CLINIC | Facility: CLINIC | Age: 61
End: 2021-07-02

## 2021-07-02 NOTE — TELEPHONE ENCOUNTER
Common side effects of Lexapro include:  drowsiness,   dizziness,   sleep problems (insomnia),   nausea,   upset stomach,   gas,   heartburn,   constipation,   weight changes,   dry mouth,   yawning,   ringing in the ears,   decreased sex drive,   impotenc

## 2021-07-02 NOTE — TELEPHONE ENCOUNTER
Patient has questions about the side effects of Lexapro, had heart palpitation about an hour ago, not sure if it's related or not, just started this medication 2 weeks ago

## 2021-07-06 ENCOUNTER — TELEPHONE (OUTPATIENT)
Dept: FAMILY MEDICINE CLINIC | Facility: CLINIC | Age: 61
End: 2021-07-06

## 2021-07-06 NOTE — TELEPHONE ENCOUNTER
LOV 6/17/2021 and at that time, pt was started on Lexapro 10 mg. She states that she started having palpitations and talked to Susana Lisa on 7/2/2021. Pt had only been taking Lexapro for 15 days at that time.  She  was advised to take Lexapro one half table

## 2021-07-06 NOTE — TELEPHONE ENCOUNTER
Please read previous message. Pt states that she feels really bad_ hot flashes and \" gassy stomach\" She has been off  Of Lexapro for 4 days.     Re routed to [facility] priority

## 2021-07-07 ENCOUNTER — TELEPHONE (OUTPATIENT)
Dept: FAMILY MEDICINE CLINIC | Facility: CLINIC | Age: 61
End: 2021-07-07

## 2021-07-07 NOTE — TELEPHONE ENCOUNTER
Called and talked to patient she went off lexapro and became nauseous so she went back in it 5 mg daily for 3 days still had some nausea so started taking gas X and if helped with the nausea so discussed when the 3 days are over she could do every other ca

## 2021-07-07 NOTE — TELEPHONE ENCOUNTER
Patient is calling requesting a nurse call FATOUMATA@ [phone] she is trying to make sure she can take a medication with her escitalopram 10 MG Oral Tab

## 2021-08-19 ENCOUNTER — TELEPHONE (OUTPATIENT)
Dept: FAMILY MEDICINE CLINIC | Facility: CLINIC | Age: 61
End: 2021-08-19

## 2021-08-19 NOTE — TELEPHONE ENCOUNTER
Pt requesting a call back from Dr. Steve Gibbons nurse regarding labs she had done at another office. No further information given.

## 2021-08-26 ENCOUNTER — TELEPHONE (OUTPATIENT)
Dept: FAMILY MEDICINE CLINIC | Facility: CLINIC | Age: 61
End: 2021-08-26

## 2021-08-26 NOTE — TELEPHONE ENCOUNTER
C/o stiff neck and feeling light headed feels walk is off center, went to PT and felt great but had to stop due to retinal surgery so stopped PT, has now resumed therapy and her neck is stiff and she feels the neck might be a problem so she would like to g

## 2021-10-01 ENCOUNTER — ORDER TRANSCRIPTION (OUTPATIENT)
Dept: PHYSICAL THERAPY | Facility: HOSPITAL | Age: 61
End: 2021-10-01

## 2021-10-01 DIAGNOSIS — M41.9 SCOLIOSIS: Primary | ICD-10-CM

## 2021-10-03 ENCOUNTER — APPOINTMENT (OUTPATIENT)
Dept: GENERAL RADIOLOGY | Facility: HOSPITAL | Age: 61
End: 2021-10-03
Attending: EMERGENCY MEDICINE
Payer: COMMERCIAL

## 2021-10-03 ENCOUNTER — HOSPITAL ENCOUNTER (EMERGENCY)
Facility: HOSPITAL | Age: 61
Discharge: HOME | End: 2021-10-03
Attending: EMERGENCY MEDICINE
Payer: COMMERCIAL

## 2021-10-03 VITALS
DIASTOLIC BLOOD PRESSURE: 78 MMHG | HEIGHT: 66 IN | TEMPERATURE: 97 F | HEART RATE: 60 BPM | SYSTOLIC BLOOD PRESSURE: 116 MMHG | RESPIRATION RATE: 18 BRPM | BODY MASS INDEX: 25 KG/M2 | OXYGEN SATURATION: 99 %

## 2021-10-03 DIAGNOSIS — M54.50 ACUTE LOW BACK PAIN, UNSPECIFIED BACK PAIN LATERALITY, UNSPECIFIED WHETHER SCIATICA PRESENT: Primary | ICD-10-CM

## 2021-10-03 PROCEDURE — 72110 X-RAY EXAM L-2 SPINE 4/>VWS: CPT | Performed by: EMERGENCY MEDICINE

## 2021-10-03 PROCEDURE — 99283 EMERGENCY DEPT VISIT LOW MDM: CPT

## 2021-10-03 PROCEDURE — 99284 EMERGENCY DEPT VISIT MOD MDM: CPT

## 2021-10-03 RX ORDER — MECLIZINE HYDROCHLORIDE 25 MG/1
25 TABLET ORAL ONCE
Status: COMPLETED | OUTPATIENT
Start: 2021-10-03 | End: 2021-10-03

## 2021-10-03 RX ORDER — CYCLOBENZAPRINE HCL 10 MG
10 TABLET ORAL 3 TIMES DAILY PRN
Qty: 20 TABLET | Refills: 0 | Status: SHIPPED | OUTPATIENT
Start: 2021-10-03 | End: 2021-10-07

## 2021-10-03 RX ORDER — MECLIZINE HYDROCHLORIDE 25 MG/1
25 TABLET ORAL 3 TIMES DAILY PRN
Qty: 20 TABLET | Refills: 0 | Status: SHIPPED | OUTPATIENT
Start: 2021-10-03

## 2021-10-03 RX ORDER — HYDROCODONE BITARTRATE AND ACETAMINOPHEN 5; 325 MG/1; MG/1
2 TABLET ORAL ONCE
Status: COMPLETED | OUTPATIENT
Start: 2021-10-03 | End: 2021-10-03

## 2021-10-03 RX ORDER — LOTEPREDNOL ETABONATE 5 MG/ML
1 SUSPENSION/ DROPS OPHTHALMIC 4 TIMES DAILY
COMMUNITY
End: 2021-11-30

## 2021-10-03 NOTE — ED PROVIDER NOTES
Patient Seen in: [facility] Emergency Department      History   Patient presents with:  Back Pain    Stated Complaint: back pain for about 6 weeks and feels off balance    Subjective:   HPI    Patient is a 30-year-old female comes emergency room for 111/87   Pulse 55   Temp 97.1 °F (36.2 °C) (Temporal)   Resp 20   Ht 167.6 cm (5' 6\")   SpO2 97%   BMI 24.86 kg/m²         Physical Exam    GENERAL: No acute distress, Well appearing and non-toxic, Alert and oriented X 3   HEENT: Normocephalic, atraumatic disease. No acute displaced osseous fracture is identified. The vertebral body heights are maintained. CONCLUSION:  Mild to moderate degenerative changes in the lumbar spine as above.    Dictated by (CST): Ady Glass MD on 10/03/2021 at [facility] [address].  [phone]    In 1 week            Medications Prescribed:  Current Discharge Medication List    START taking these medications    !! meclizine 25 MG Oral Tab  Take 1 tablet (25 mg total) by mouth 3 (three) times daily as needed

## 2021-10-03 NOTE — ED INITIAL ASSESSMENT (HPI)
Retinal detachment surgery and had to do posturing for 10 days, history of scoliosis, been seeing PT, massage and chiropractor adjusted x2 and utilized a neck stretching device.

## 2021-10-04 ENCOUNTER — TELEPHONE (OUTPATIENT)
Dept: FAMILY MEDICINE CLINIC | Facility: CLINIC | Age: 61
End: 2021-10-04

## 2021-10-04 NOTE — TELEPHONE ENCOUNTER
Patient requesting prescription for Standard [place].  Was seen in ED yesterday for low back pain. Declined pain medication at the time of visit.     Routed to Dr. Mami Castellon

## 2021-10-04 NOTE — TELEPHONE ENCOUNTER
Pt is in extreme pain and the medication they gave her in the ER last night is not working. Pain in lower back. She is requesting another medication for pain. The Cyclobenzaprine 10 mg is not working.

## 2021-10-04 NOTE — TELEPHONE ENCOUNTER
Patient came into office, here now, states the hydrocodone they gave her while she was in the hospital worked, feels a prescription for that might work for her.  Declined it at first because she felt off balance when she first took it but after the initial

## 2021-10-05 ENCOUNTER — OFFICE VISIT (OUTPATIENT)
Dept: FAMILY MEDICINE CLINIC | Facility: CLINIC | Age: 61
End: 2021-10-05
Payer: COMMERCIAL

## 2021-10-05 ENCOUNTER — TELEPHONE (OUTPATIENT)
Dept: FAMILY MEDICINE CLINIC | Facility: CLINIC | Age: 61
End: 2021-10-05

## 2021-10-05 ENCOUNTER — TELEPHONE (OUTPATIENT)
Dept: PHYSICAL THERAPY | Facility: HOSPITAL | Age: 61
End: 2021-10-05

## 2021-10-05 ENCOUNTER — OFFICE VISIT (OUTPATIENT)
Dept: PHYSICAL THERAPY | Age: 61
End: 2021-10-05
Attending: FAMILY MEDICINE
Payer: COMMERCIAL

## 2021-10-05 VITALS
DIASTOLIC BLOOD PRESSURE: 62 MMHG | SYSTOLIC BLOOD PRESSURE: 108 MMHG | HEART RATE: 75 BPM | RESPIRATION RATE: 16 BRPM | WEIGHT: 149 LBS | BODY MASS INDEX: 23.67 KG/M2 | TEMPERATURE: 97 F | HEIGHT: 66.5 IN

## 2021-10-05 DIAGNOSIS — M54.50 CHRONIC BILATERAL LOW BACK PAIN WITHOUT SCIATICA: ICD-10-CM

## 2021-10-05 DIAGNOSIS — M41.9 SCOLIOSIS, UNSPECIFIED SCOLIOSIS TYPE, UNSPECIFIED SPINAL REGION: Primary | ICD-10-CM

## 2021-10-05 DIAGNOSIS — G89.29 CHRONIC BILATERAL LOW BACK PAIN WITHOUT SCIATICA: ICD-10-CM

## 2021-10-05 DIAGNOSIS — M54.2 CERVICALGIA: ICD-10-CM

## 2021-10-05 PROCEDURE — 97161 PT EVAL LOW COMPLEX 20 MIN: CPT

## 2021-10-05 PROCEDURE — 99214 OFFICE O/P EST MOD 30 MIN: CPT | Performed by: PHYSICIAN ASSISTANT

## 2021-10-05 PROCEDURE — 3078F DIAST BP <80 MM HG: CPT | Performed by: PHYSICIAN ASSISTANT

## 2021-10-05 PROCEDURE — 97110 THERAPEUTIC EXERCISES: CPT

## 2021-10-05 PROCEDURE — 3008F BODY MASS INDEX DOCD: CPT | Performed by: PHYSICIAN ASSISTANT

## 2021-10-05 PROCEDURE — 3074F SYST BP LT 130 MM HG: CPT | Performed by: PHYSICIAN ASSISTANT

## 2021-10-05 RX ORDER — ERYTHROMYCIN 5 MG/G
OINTMENT OPHTHALMIC
COMMUNITY
Start: 2021-09-02

## 2021-10-05 NOTE — TELEPHONE ENCOUNTER
Pt would like to know if MRI should be done for whole spine and not just top and bottom. Pt would like an answer promptly so she is able to schedule her appt. Soon.

## 2021-10-05 NOTE — PROGRESS NOTES
SPINE EVALUATION:   Referring Physician: Dr. Sharon De La O  Diagnosis: Scoliosis (M41.9) Date of Service: 10/5/2021     PATIENT SUMMARY   Alexis Medina is a 64year old y/o female who presents to therapy today with complaints of lower back pain, neck pain a balance with walking. Hussein Santiago describes prior level of function: prior to June she had no back issues or pain or feeling of off balance, she did not feel the tightness in her neck.      Pt goals include: get rid of the pain, try to improve her equilibrium prominences associated with rotary component    Gait: good gait mechanics    Balance: SLS R 10 sec, L 10 sec  *pt able to sustain good balance although note moderate ankle/knee strategy    ROM:     Cervical           Flexion WFL   Extension WFL   R Sideben cervical support, review of current HEP exercises and instruction on avoiding cervical compensation  Patient was instructed in and issued a HEP for: may continue with current exercise regimen although focus on avoiding compensation patterns, focus on breat co-sign or sign and return this letter via fax as soon as possible to [phone].  If you have any questions, please contact me at Dept: [phone]    Sincerely,  Electronically signed by therapist: Nila New, PT,DPT,SB-C2    Physician's certific

## 2021-10-05 NOTE — PROGRESS NOTES
Patient presents with:  ER F/U: D/C10/3 acute low back pain       HISTORY OF PRESENT ILLNESS  Wilmot Gitelman is a 64year old female who presents for back pain. Notes that she has had ongoing upper and lower back pain for quite some time.   She does feel Vitamins-Minerals (MULTIVITAMIN ADULT OR), Take by mouth., Disp: , Rfl:   •  Probiotic Product (PROBIOTIC DAILY OR), Take by mouth. Takes probium  , Disp: , Rfl:   •  loratadine (CLARITIN) 10 MG Oral Tab, Take 10 mg by mouth daily.  Takes morning and evenin Cervicalgia  Plan: Already has appt set up with ortho spine- needs imaging before she sees them. Suspect most is muscular pain. Encouraged her to keep up with heat, gentle exercises. Try to change sleep position and monitoring sitting/ shoulder position.  Emmanuel Hernandez

## 2021-10-07 ENCOUNTER — TELEPHONE (OUTPATIENT)
Dept: FAMILY MEDICINE CLINIC | Facility: CLINIC | Age: 61
End: 2021-10-07

## 2021-10-07 RX ORDER — METHOCARBAMOL 750 MG/1
750 TABLET, FILM COATED ORAL 3 TIMES DAILY PRN
Qty: 30 TABLET | Refills: 0 | Status: SHIPPED | OUTPATIENT
Start: 2021-10-07

## 2021-10-07 NOTE — TELEPHONE ENCOUNTER
Pt states she was prescribed cyclobenzaprine due to her back pain. Pt sates she was in on 10/05/21 to see Xavier Amaya regarding pain. Pt states Antoinette Francois advised pt to keep taking medication and if did not work to request a new medication.  Pt states she is

## 2021-10-08 ENCOUNTER — OFFICE VISIT (OUTPATIENT)
Dept: PHYSICAL THERAPY | Age: 61
End: 2021-10-08
Attending: FAMILY MEDICINE
Payer: COMMERCIAL

## 2021-10-08 PROCEDURE — 97140 MANUAL THERAPY 1/> REGIONS: CPT

## 2021-10-08 PROCEDURE — 97110 THERAPEUTIC EXERCISES: CPT

## 2021-10-08 PROCEDURE — 97112 NEUROMUSCULAR REEDUCATION: CPT

## 2021-10-08 NOTE — PROGRESS NOTES
Diagnosis: Scoliosis (M41.9)  Insurance (Authorized # of Visits):  [facility] PPO (pt with now 6 visits left for calendar year)      Authorizing Physician: Dr. Roseline Alvarez  Next MD visit: none scheduled  Fall Risk: standard         Precautions: n/a           Florrie Erm in feeling \"off balance\" - in progress  5. Pt will demo improved breathing mechanics with improved diaphragmatic expansion and lateral costal expansion, and decreased upper chest compensation - in progress       Date: 10/8/21  Tx #: 2 Date:   Tx#: Date:

## 2021-10-11 ENCOUNTER — PATIENT MESSAGE (OUTPATIENT)
Dept: FAMILY MEDICINE CLINIC | Facility: CLINIC | Age: 61
End: 2021-10-11

## 2021-10-11 NOTE — TELEPHONE ENCOUNTER
From: Timmie Dancer  To: TK Drummond  Sent: 10/11/2021 12:19 PM CDT  Subject: Chiropractor     Jose Luis Lindo, I saw you last week and you had mentioned that you knew of 3 chiropractors. Would you provide their names? I would like to see one.     Thanks

## 2021-10-18 ENCOUNTER — OFFICE VISIT (OUTPATIENT)
Dept: PHYSICAL THERAPY | Age: 61
End: 2021-10-18
Attending: FAMILY MEDICINE
Payer: COMMERCIAL

## 2021-10-18 PROCEDURE — 97140 MANUAL THERAPY 1/> REGIONS: CPT

## 2021-10-18 PROCEDURE — 97110 THERAPEUTIC EXERCISES: CPT

## 2021-10-18 PROCEDURE — 97112 NEUROMUSCULAR REEDUCATION: CPT

## 2021-10-18 NOTE — PROGRESS NOTES
Diagnosis: Scoliosis (M41.9)  Insurance (Authorized # of Visits):  [facility] PPO (pt with now 6 visits left for calendar year)      Authorizing Physician: Dr. Dhaval Gary  Next MD visit: none scheduled  Fall Risk: standard         Precautions: n/a           Jai Owens 10/18/21  Tx#:3 Date:   Tx#:   Therapeutic exercises Prone low trap forearm lift modified to elbow lift [id number] R/L    Modified pivot prone at wall [id number]    Serratus lateral slide at wall YTB 5x5sec     Supine: passive stretching B UTs    Prone low trap Neuro Re-ed x1       Total Timed Treatment: 45 min  Total Treatment Time: 45 min

## 2021-10-20 ENCOUNTER — OFFICE VISIT (OUTPATIENT)
Dept: PHYSICAL MEDICINE AND REHAB | Facility: CLINIC | Age: 61
End: 2021-10-20
Payer: COMMERCIAL

## 2021-10-20 ENCOUNTER — TELEPHONE (OUTPATIENT)
Dept: NEUROLOGY | Facility: CLINIC | Age: 61
End: 2021-10-20

## 2021-10-20 VITALS
DIASTOLIC BLOOD PRESSURE: 60 MMHG | HEIGHT: 66.5 IN | SYSTOLIC BLOOD PRESSURE: 104 MMHG | HEART RATE: 70 BPM | OXYGEN SATURATION: 99 % | WEIGHT: 149 LBS | BODY MASS INDEX: 23.67 KG/M2

## 2021-10-20 DIAGNOSIS — R26.89 ABNORMALITY OF GAIT DUE TO IMPAIRMENT OF BALANCE: Primary | ICD-10-CM

## 2021-10-20 DIAGNOSIS — M43.6 NECK STIFFNESS: ICD-10-CM

## 2021-10-20 PROCEDURE — 99243 OFF/OP CNSLTJ NEW/EST LOW 30: CPT | Performed by: PHYSICAL MEDICINE & REHABILITATION

## 2021-10-20 PROCEDURE — 3008F BODY MASS INDEX DOCD: CPT | Performed by: PHYSICAL MEDICINE & REHABILITATION

## 2021-10-20 PROCEDURE — 3078F DIAST BP <80 MM HG: CPT | Performed by: PHYSICAL MEDICINE & REHABILITATION

## 2021-10-20 PROCEDURE — 3074F SYST BP LT 130 MM HG: CPT | Performed by: PHYSICAL MEDICINE & REHABILITATION

## 2021-10-20 NOTE — H&P
[facility]    History and Physical    Sharon Meza Patient Status:  No patient class for patient encounter    1960 MRN [id number]   Location [facility], lower back pain which has been resolving. Her primary care physician ordered MRIs of both the lumbar and the cervical spine, but these have not yet been done as she wanted to see me first before proceeding with imaging.   She has no history of spine surger admits  Balance: admits   Psychiatric  Anxiety: admits  Depressed Mood: admits   Physical Exam:   Vital Signs:  Blood pressure 104/60, pulse 70, height 66.5\", weight 149 lb (67.6 kg), SpO2 99 %. Nursing note and vitals reviewed.    Constitutional: She a symptoms recommend MRI of the cervical spine. Might benefit from PT specifically for gait/balance. Discussed that decreased vision in the right eye might also be affecting her balance.     If her lower back continues to bother her I can assess this on next

## 2021-10-20 NOTE — PATIENT INSTRUCTIONS
If you have the imaging done at [facility] please bring the MRI report and disc with you for follow up.

## 2021-10-20 NOTE — TELEPHONE ENCOUNTER
[employer] for authorization of approval for MRI C-spine wo cpt code  54478 Authorization is not required. Transaction ID: [id number]. Pt. Informed. She will call to schedule appt.

## 2021-10-22 ENCOUNTER — TELEPHONE (OUTPATIENT)
Dept: FAMILY MEDICINE CLINIC | Facility: CLINIC | Age: 61
End: 2021-10-22

## 2021-10-22 ENCOUNTER — OFFICE VISIT (OUTPATIENT)
Dept: PHYSICAL THERAPY | Age: 61
End: 2021-10-22
Attending: FAMILY MEDICINE
Payer: COMMERCIAL

## 2021-10-22 PROCEDURE — 97110 THERAPEUTIC EXERCISES: CPT

## 2021-10-22 PROCEDURE — 97140 MANUAL THERAPY 1/> REGIONS: CPT

## 2021-10-22 NOTE — PROGRESS NOTES
ProgressSummary  Pt has attended 4 visits in Physical Therapy.      Diagnosis: Scoliosis (M41.9)  Insurance (Authorized # of Visits):  [employer] PPO (pt with now 4/6 visits left for calendar year); pt saw another PT 1x since IE so 5/6 currently used.)      Aut rotary component, tendency to deviate head/neck to R     Gait: good gait mechanics     Balance: SLS R 10 sec, L 10 sec  *pt able to sustain good balance although note moderate ankle/knee strategy     ROM:      Cervical           Flexion [facility]   Extension [facility] least 50% decrease in feeling \"off balance\" - improved  5. Pt will demo improved breathing mechanics with improved diaphragmatic expansion and lateral costal expansion, and decreased upper chest compensation - improved  UPDATE 10/22:  6.  Pt will demo und suboccipital release Supine: cervical manual traction, STM cervical paraspinals focus on L, suboccipital release    Seated: 2nd rib mob  R/L, TP release B UTs/levators focus on R Seated: 1st and 2nd rib mobilizations on R    Seated: TP release R UT, levato

## 2021-10-25 ENCOUNTER — OFFICE VISIT (OUTPATIENT)
Dept: PHYSICAL THERAPY | Age: 61
End: 2021-10-25
Attending: FAMILY MEDICINE
Payer: COMMERCIAL

## 2021-10-25 PROCEDURE — 97140 MANUAL THERAPY 1/> REGIONS: CPT

## 2021-10-25 PROCEDURE — 97110 THERAPEUTIC EXERCISES: CPT

## 2021-10-25 NOTE — PROGRESS NOTES
Diagnosis: Scoliosis (M41.9)  Insurance (Authorized # of Visits):  [employer] PPO (pt with now 4/6 visits left for calendar year); pt saw another PT 1x since IE so 5/6 currently used.)      Authorizing Physician: Dr. Nathalie Slade  Next MD visit: none scheduled  Fall R 4/5 4/5     Knee Extension (L3) 5/5 5/5     Knee Flexion 5/5 5/5     Ankle DF (L4) 5/5 5/5     EHL (L5) 5/5 5/5     Ankle PF (S1) 5/5 5/5     Hip Extension 5/5 5/5     Glut max 4/5 4-/5        Core: 4-/5     Scapula:     Grossly: 4/5 although note mod [id number]    Serratus lateral slide sustained hold at wall 5x5sec     Supine passive stretching B UTs    Re-assessment testing    Prone: low trap scapular retraction BUEs Supine passive stretching B UTs, ivan paris    Seated self 1st/2nd rib mobiliza prone low trap lift to bilateral  10/25: self mobilization with sheet/tennis ball    Plan: Pt to continue to try contacting insurance for further PT approval. Will continue as able.   Continue with manual interventions with focus on 1st and 2nd rib and TP r

## 2021-10-27 ENCOUNTER — TELEPHONE (OUTPATIENT)
Dept: PHYSICAL THERAPY | Facility: HOSPITAL | Age: 61
End: 2021-10-27

## 2021-10-29 ENCOUNTER — TELEPHONE (OUTPATIENT)
Dept: PHYSICAL MEDICINE AND REHAB | Facility: CLINIC | Age: 61
End: 2021-10-29

## 2021-10-29 ENCOUNTER — OFFICE VISIT (OUTPATIENT)
Dept: PHYSICAL MEDICINE AND REHAB | Facility: CLINIC | Age: 61
End: 2021-10-29
Payer: COMMERCIAL

## 2021-10-29 VITALS
BODY MASS INDEX: 23.67 KG/M2 | HEART RATE: 66 BPM | WEIGHT: 149 LBS | DIASTOLIC BLOOD PRESSURE: 62 MMHG | HEIGHT: 66.5 IN | OXYGEN SATURATION: 97 % | SYSTOLIC BLOOD PRESSURE: 110 MMHG

## 2021-10-29 DIAGNOSIS — M79.18 MYOFASCIAL MUSCLE PAIN: Primary | ICD-10-CM

## 2021-10-29 PROCEDURE — 3074F SYST BP LT 130 MM HG: CPT | Performed by: PHYSICAL MEDICINE & REHABILITATION

## 2021-10-29 PROCEDURE — 99214 OFFICE O/P EST MOD 30 MIN: CPT | Performed by: PHYSICAL MEDICINE & REHABILITATION

## 2021-10-29 PROCEDURE — 3008F BODY MASS INDEX DOCD: CPT | Performed by: PHYSICAL MEDICINE & REHABILITATION

## 2021-10-29 PROCEDURE — 3078F DIAST BP <80 MM HG: CPT | Performed by: PHYSICAL MEDICINE & REHABILITATION

## 2021-10-29 NOTE — PATIENT INSTRUCTIONS
Please make a follow up appointment in 2 weeks, and if you are not any better after your scheduled acupuncture + PT visits then we will go ahead and do the trigger point injections next time I see you.

## 2021-10-29 NOTE — PROGRESS NOTES
Progress note    C/C: abnormal balance, neck pain, low back discomfort    HPI: 49-year-old female presents for follow-up. Underwent MRI of the cervical spine and returns to discuss results. Symptoms for the most part unchanged since the last visit.   She vertebral body heights are maintained.                        Impression   CONCLUSION:  Mild to moderate degenerative changes in the lumbar spine as above.       MRI cervical spine 10/22/21:  FINDINGS:   There is slight reversal of the cervical lordosis dylan clinically. If indicated, further characterization with thyroid ultrasound could be performed. This report was performed utilizing speech recognition software technology. Despite thorough proofreading, speech recognition errors could escape detection.

## 2021-10-29 NOTE — TELEPHONE ENCOUNTER
Initiated authorization for TPIs CPT 92875+ with [employer]  Coverage is active Transaction ID: [id number]   Status: Approved-authorization is not required per health plan

## 2021-11-04 ENCOUNTER — APPOINTMENT (OUTPATIENT)
Dept: PHYSICAL THERAPY | Age: 61
End: 2021-11-04
Attending: FAMILY MEDICINE
Payer: COMMERCIAL

## 2021-11-08 ENCOUNTER — OFFICE VISIT (OUTPATIENT)
Dept: PHYSICAL THERAPY | Age: 61
End: 2021-11-08
Attending: FAMILY MEDICINE
Payer: COMMERCIAL

## 2021-11-08 DIAGNOSIS — M41.9 SCOLIOSIS: ICD-10-CM

## 2021-11-08 PROCEDURE — 97112 NEUROMUSCULAR REEDUCATION: CPT

## 2021-11-08 PROCEDURE — 97140 MANUAL THERAPY 1/> REGIONS: CPT

## 2021-11-08 PROCEDURE — 97110 THERAPEUTIC EXERCISES: CPT

## 2021-11-08 NOTE — PROGRESS NOTES
Diagnosis: Scoliosis (M41.9)  Insurance (Authorized # of Visits):  [employer] PPO (per insurance pt ok to be seen above soft cap; 8 per POC)    Authorizing Physician: Dr. Courtney Hercules  Next MD visit: none scheduled  Fall Risk: standard         Precautions: n/a 4/5     Knee Extension (L3) 5/5 5/5     Knee Flexion 5/5 5/5     Ankle DF (L4) 5/5 5/5     EHL (L5) 5/5 5/5     Ankle PF (S1) 5/5 5/5     Hip Extension 5/5 5/5     Glut max 4/5 4-/5        Core: 4-/5     Scapula:     Grossly: 4/5 although note moderate c 5x5sec     Supine passive stretching B UTs    Re-assessment testing    Prone: low trap scapular retraction BUEs Supine passive stretching B UTs, ivan paris    Seated self 1st/2nd rib mobilization on R with sheet/lacrosse ball   Manual Therapy Seate manual interventions with focus on 1st and 2nd rib and TP release on R. Review SSEs and progress to correction at head/neck. Continue with scapular stabilization work. May introduce pt to theracane. Charges:  Man x1, TherEx x1, Neuro Re-ed x1      Total

## 2021-11-09 ENCOUNTER — MED REC SCAN ONLY (OUTPATIENT)
Dept: PHYSICAL MEDICINE AND REHAB | Facility: CLINIC | Age: 61
End: 2021-11-09

## 2021-11-11 ENCOUNTER — OFFICE VISIT (OUTPATIENT)
Dept: PHYSICAL THERAPY | Age: 61
End: 2021-11-11
Attending: FAMILY MEDICINE
Payer: COMMERCIAL

## 2021-11-11 DIAGNOSIS — M41.9 SCOLIOSIS: ICD-10-CM

## 2021-11-11 PROCEDURE — 97112 NEUROMUSCULAR REEDUCATION: CPT

## 2021-11-11 PROCEDURE — 97110 THERAPEUTIC EXERCISES: CPT

## 2021-11-11 PROCEDURE — 97140 MANUAL THERAPY 1/> REGIONS: CPT

## 2021-11-11 NOTE — PROGRESS NOTES
Diagnosis: Scoliosis (M41.9)  Insurance (Authorized # of Visits):  [employer] PPO (per insurance pt ok to be seen above soft cap; 8 per POC)    Authorizing Physician: Dr. Khari Loera  Next MD visit: none scheduled  Fall Risk: standard         Precautions: n/a Knee Extension (L3) 5/5 5/5     Knee Flexion 5/5 5/5     Ankle DF (L4) 5/5 5/5     EHL (L5) 5/5 5/5     Ankle PF (S1) 5/5 5/5     Hip Extension 5/5 5/5     Glut max 4/5 4-/5        Core: 4-/5     Scapula:     Grossly: 4/5 although note moderate cervical Supine passive stretching B UTs, levators, scalenes    Seated self 1st/2nd rib mobilization on R with sheet/lacrosse ball   Manual Therapy Seated: TP release B UTs and levators focus on R, 1st and 2nd rib mobs B focus on R Seated:  Theragun B cervical UTs/l for self cervical muscle release    Charges:  Man x1, TherEx x1, Neuro Re-ed x1      Total Timed Treatment: 44 min  Total Treatment Time: 44 min

## 2021-11-16 ENCOUNTER — OFFICE VISIT (OUTPATIENT)
Dept: PHYSICAL THERAPY | Age: 61
End: 2021-11-16
Attending: FAMILY MEDICINE
Payer: COMMERCIAL

## 2021-11-16 DIAGNOSIS — M41.9 SCOLIOSIS: ICD-10-CM

## 2021-11-16 PROCEDURE — 97110 THERAPEUTIC EXERCISES: CPT

## 2021-11-16 PROCEDURE — 97140 MANUAL THERAPY 1/> REGIONS: CPT

## 2021-11-16 NOTE — PROGRESS NOTES
ProgressSummary  Pt has attended 8 visits in Physical Therapy.      Diagnosis: Scoliosis (M41.9)  Insurance (Authorized # of Visits):  [employer] PPO (per insurance pt ok to be seen above soft cap; 8 per POC)    Authorizing Physician: Dr. Mayuri Carlson  Next MD visit: 5/5 5/5     Shoulder IR 5/5 5/5     Bicep (C6) 5/5 5/5     Tricep (C7) 5/5 5/5               Hip Flexion (L2) 5/5 5/5     Knee Extension (L3) 5/5 5/5     Knee Flexion 5/5 5/5     Ankle DF (L4) 5/5 5/5     EHL (L5) 5/5 5/5     Ankle PF (S1) 5/5 5/5   you have any questions, please contact me at Dept: [phone].     Sincerely,  Electronically signed by therapist: Cecelia Beckett, PT ,DPT,SB-C2    Physician's certification required:  Yes  Please co-sign or sign and return this letter via fax as soon a axially and laterally with added focus into L lateral costal expansion    Short hanging with lateral costal expansion       Current HEP:   10/5:may continue with current exercise regimen although focus on avoiding compensation patterns, focus on breathing

## 2021-11-18 ENCOUNTER — OFFICE VISIT (OUTPATIENT)
Dept: PHYSICAL THERAPY | Age: 61
End: 2021-11-18
Attending: FAMILY MEDICINE
Payer: COMMERCIAL

## 2021-11-18 DIAGNOSIS — M41.9 SCOLIOSIS: ICD-10-CM

## 2021-11-18 PROCEDURE — 97110 THERAPEUTIC EXERCISES: CPT

## 2021-11-18 PROCEDURE — 97140 MANUAL THERAPY 1/> REGIONS: CPT

## 2021-11-18 NOTE — PROGRESS NOTES
Diagnosis: Scoliosis (M41.9)  Insurance (Authorized # of Visits):  [employer] PPO (per insurance pt ok to be seen above soft cap; 15 per POC)    Authorizing Physician: Dr. Sanjay Salazar  Next MD visit: none scheduled  Fall Risk: standard         Precautions: n/a Date: 11/11/21  Tx#:7 Date: 11/16/21  Tx#: 8 Date: 11/18/21  Tx#: 9   Therapeutic exercises Seated self 1st rib mob/TP release on R with sheet then added lacrosse ball    Review of current HEP Supine passive stretching cervical UTs    Prone low trap press in the morning.    10/8: prone low trap elbow lift, standing modified pivot prone, serratus lateral pull YTB - handout issued  *pt to hold off on all exercises except above and planks  10/18: add seated schroth expansion axially and laterally  10/22: adjust

## 2021-11-23 ENCOUNTER — APPOINTMENT (OUTPATIENT)
Dept: PHYSICAL THERAPY | Age: 61
End: 2021-11-23
Attending: FAMILY MEDICINE
Payer: COMMERCIAL

## 2021-11-26 ENCOUNTER — OFFICE VISIT (OUTPATIENT)
Dept: PHYSICAL THERAPY | Age: 61
End: 2021-11-26
Attending: FAMILY MEDICINE
Payer: COMMERCIAL

## 2021-11-26 DIAGNOSIS — M41.9 SCOLIOSIS: ICD-10-CM

## 2021-11-26 PROCEDURE — 97110 THERAPEUTIC EXERCISES: CPT

## 2021-11-26 PROCEDURE — 97140 MANUAL THERAPY 1/> REGIONS: CPT

## 2021-11-26 NOTE — PROGRESS NOTES
Diagnosis: Scoliosis (M41.9)  Insurance (Authorized # of Visits):  [employer] PPO (per insurance pt ok to be seen above soft cap; 15 per POC)    Authorizing Physician: Dr. Cash Rg  Next MD visit: none scheduled  Fall Risk: standard         Precautions: n/a passive stretching cervical UTs    Prone low trap press back [id number]     Supine passive stretching UTs    Short hanging at stall bar 3x with lateral costal expansion    Pivot pron at wall 10x 5-10sec hold Supine passive stretching B UTs    Standing pivot ball  11/8: sitting and standing posture with lateral costal expansion with focus on L  11/11: short/long hanging  11/18: standing back to wall IAIN RTB    Plan:  Continue with manual interventions as indicated.  Progress strengthening/resistance as tolerate

## 2021-11-29 ENCOUNTER — OFFICE VISIT (OUTPATIENT)
Dept: PHYSICAL THERAPY | Age: 61
End: 2021-11-29
Attending: FAMILY MEDICINE
Payer: COMMERCIAL

## 2021-11-29 DIAGNOSIS — M41.9 SCOLIOSIS: ICD-10-CM

## 2021-11-29 PROCEDURE — 97140 MANUAL THERAPY 1/> REGIONS: CPT

## 2021-11-29 PROCEDURE — 97110 THERAPEUTIC EXERCISES: CPT

## 2021-11-29 NOTE — PROGRESS NOTES
Diagnosis: Scoliosis (M41.9)  Insurance (Authorized # of Visits):  [employer] PPO (per insurance pt ok to be seen above soft cap; 15 per POC)    Authorizing Physician: Dr. Jason Esquivel  Next MD visit: none scheduled  Fall Risk: standard         Precautions: n/a UTs    Prone low trap press back [id number]     Supine passive stretching UTs    Short hanging at stall bar 3x with lateral costal expansion    Pivot pron at wall 10x 5-10sec hold Supine passive stretching B UTs    Standing pivot prone sustained hold 10x 5-1 lateral costal expansion with focus on L  11/11: short/long hanging  11/18: standing back to wall IAIN RTB    Plan:  Continue with manual interventions as indicated. Progress strengthening/resistance as tolerated. Progress resistance band as tolerated.     C

## 2021-11-30 ENCOUNTER — TELEPHONE (OUTPATIENT)
Dept: FAMILY MEDICINE CLINIC | Facility: CLINIC | Age: 61
End: 2021-11-30

## 2021-11-30 ENCOUNTER — HOSPITAL ENCOUNTER (OUTPATIENT)
Dept: ULTRASOUND IMAGING | Facility: HOSPITAL | Age: 61
Discharge: HOME | End: 2021-11-30
Attending: PHYSICIAN ASSISTANT
Payer: COMMERCIAL

## 2021-11-30 DIAGNOSIS — E04.1 THYROID NODULE: ICD-10-CM

## 2021-11-30 NOTE — TELEPHONE ENCOUNTER
Called katie and told her this was a common reaction and to give it a couple of days and if not better in reaction call back.

## 2021-11-30 NOTE — PROGRESS NOTES
Patient presents with: Anxiety: would like to discuss if could be on medication for anxiety       HISTORY OF PRESENT ILLNESS  Michele Restrepo is a 64year old female who presents for anxiety.  Notes that these last 6 months have been difficult since her e 10 MG Oral Tab, Take 10 mg by mouth daily. Takes morning and evening , Disp: , Rfl:   •  Nutritional Supplements (VITAMIN D BOOSTER OR), Take  by mouth., Disp: , Rfl:   •  Spironolactone 100 MG Oral Tab, Take 100 mg by mouth daily. , Disp: , Rfl:   •  Multi

## 2021-11-30 NOTE — TELEPHONE ENCOUNTER
Patient seen today by Tyler Carr PA-C. She took her first escitalopram 10 mg and feels lightheaded and flushed. No other symptoms.   Please call patient back at [phone]

## 2021-12-01 ENCOUNTER — APPOINTMENT (OUTPATIENT)
Dept: PHYSICAL THERAPY | Age: 61
End: 2021-12-01
Attending: FAMILY MEDICINE
Payer: COMMERCIAL

## 2021-12-01 NOTE — TELEPHONE ENCOUNTER
Patient notified. She states she is having eye surgery tomorrow and is already nervous about it. She will resume taking on Friday.

## 2021-12-10 ENCOUNTER — OFFICE VISIT (OUTPATIENT)
Dept: PHYSICAL THERAPY | Age: 61
End: 2021-12-10
Attending: FAMILY MEDICINE
Payer: COMMERCIAL

## 2021-12-10 PROCEDURE — 97140 MANUAL THERAPY 1/> REGIONS: CPT

## 2021-12-10 PROCEDURE — 97110 THERAPEUTIC EXERCISES: CPT

## 2021-12-10 NOTE — PROGRESS NOTES
Diagnosis: Scoliosis (M41.9)  Insurance (Authorized # of Visits):  [employer] PPO (per insurance pt ok to be seen above soft cap; 15 per POC)    Authorizing Physician: Dr. Salomon ref.  provider found  Next MD visit: none scheduled  Fall Risk: standard         Precaut [id number]    Standing rows BTB [id number]    Standing low rows BTB 51p4frm    Serratus pulls lateral BTB 07y0dwo Supine passive stretching UTs    Short hanging at stall bar 3x with lateral costal expansion    Pivot pron at wall 10x 5-10sec hold Supine passive manual interventions as indicated. Progress strengthening/resistance as tolerated. Progress resistance band as tolerated. Charges:  Man x2,  TherEx x1 Total Timed Treatment: 35 min  Total Treatment Time: 35 min

## 2021-12-17 ENCOUNTER — APPOINTMENT (OUTPATIENT)
Dept: PHYSICAL THERAPY | Age: 61
End: 2021-12-17
Attending: FAMILY MEDICINE
Payer: COMMERCIAL

## 2021-12-22 ENCOUNTER — OFFICE VISIT (OUTPATIENT)
Dept: PHYSICAL THERAPY | Age: 61
End: 2021-12-22
Attending: FAMILY MEDICINE
Payer: COMMERCIAL

## 2021-12-22 PROCEDURE — 97140 MANUAL THERAPY 1/> REGIONS: CPT

## 2021-12-22 PROCEDURE — 97110 THERAPEUTIC EXERCISES: CPT

## 2021-12-22 NOTE — PROGRESS NOTES
Diagnosis: Scoliosis (M41.9)  Insurance (Authorized # of Visits):  [employer] PPO (per insurance pt ok to be seen above soft cap; 15 per POC)    Authorizing Physician: Dr. Salomon ref.  provider found  Next MD visit: none scheduled  Fall Risk: standard         Precaut BTB 89c4yle B    Standing low row unilateral 20x    Serratus pulls lateral BTB 54b1exf Seated IAIN BTB 20x    Standing rows BTB 69x1xaz    Standing low rows BTB 67f9cbr    Serratus pulls BTB 20x    Standing back to wall horiz abd BTB palms up, then palms do Re-assess and anticipate discharge    Charges:  Man x1,  TherEx x2 Total Timed Treatment: 38 min  Total Treatment Time: 38 min

## 2021-12-28 ENCOUNTER — OFFICE VISIT (OUTPATIENT)
Dept: PHYSICAL THERAPY | Age: 61
End: 2021-12-28
Attending: FAMILY MEDICINE
Payer: COMMERCIAL

## 2021-12-28 PROCEDURE — 97110 THERAPEUTIC EXERCISES: CPT

## 2021-12-28 PROCEDURE — 97112 NEUROMUSCULAR REEDUCATION: CPT

## 2021-12-28 PROCEDURE — 97140 MANUAL THERAPY 1/> REGIONS: CPT

## 2021-12-28 NOTE — PROGRESS NOTES
Vickeyumjg  Pt has attended 13 visits in Physical Therapy.      Diagnosis: Scoliosis (M41.9)  Insurance (Authorized # of Visits):  [employer] PPO (per insurance pt ok to be seen above soft cap; 15 per POC)    Authorizing Physician: Dr. Mario Santiago Next restrictions at L lumbar paraspinals, moderate restrictions at R cervical musculature  Sensation: intact to light touch       Goals: To be met in 8-12 sessions  1.  Pt will verbalize and demo compliance with HEP at least 75% of the time to allow for indepe 73p47wns    Prone mid trap 82o5sso R/L Seated IAIN BTB 74b77nks    Standing rows BTB 78z3ewc    Standing low rows BTB 66x3dza B    Standing low row unilateral 20x    Serratus pulls lateral BTB 32f9vvb Seated IAIN BTB 20x    Standing rows BTB 39q6fci    Stand mobilization with sheet/tennis ball  11/8: sitting and standing posture with lateral costal expansion with focus on L  11/11: short/long hanging  11/18: standing back to wall IAIN RTB  12/28: sidely thoracic rotations    Charges:  Man x1,  TherEx x1, Neuro R

## 2022-01-02 RX ORDER — BILBERRY 100 MG
CAPSULE ORAL
COMMUNITY
Start: 2021-11-01

## 2022-01-02 RX ORDER — CALCIUM CARBONATE 300MG(750)
TABLET,CHEWABLE ORAL
COMMUNITY
Start: 2021-12-23

## 2022-01-02 RX ORDER — CHLORAL HYDRATE 500 MG
4 CAPSULE ORAL
COMMUNITY
Start: 2015-12-30

## 2022-01-03 NOTE — PROGRESS NOTES
Patient presents with: Anxiety: 4 week f/u. Still feeling anxious but it is improving. HISTORY OF PRESENT ILLNESS  Sonia Cox is a 64year old female who presents for anxiety follow up. She has been taking lexapro for about 1 month now.  Feels Nutritional Supplements (VITAMIN D BOOSTER OR), Take  by mouth., Disp: , Rfl:   •  Spironolactone 100 MG Oral Tab, Take 50 mg by mouth daily. , Disp: , Rfl:   •  ZINC ACETATE OR, , Disp: , Rfl:   •  Ascorbic Acid (VITAMIN C ER OR), , Disp: , Rfl:   •  Probi

## 2022-01-24 ENCOUNTER — HOSPITAL ENCOUNTER (OUTPATIENT)
Dept: ULTRASOUND IMAGING | Age: 62
Discharge: HOME | End: 2022-01-24
Attending: PHYSICIAN ASSISTANT
Payer: COMMERCIAL

## 2022-01-24 PROCEDURE — 76536 US EXAM OF HEAD AND NECK: CPT | Performed by: PHYSICIAN ASSISTANT

## 2022-02-21 PROBLEM — H26.9 CATARACT: Status: ACTIVE | Noted: 2022-02-21

## 2022-02-21 PROBLEM — H04.129 DRY EYE: Status: ACTIVE | Noted: 2022-02-21

## 2022-02-21 PROBLEM — H35.413 BILATERAL RETINAL LATTICE DEGENERATION: Status: ACTIVE | Noted: 2022-02-21

## 2022-02-21 PROBLEM — H01.009 BLEPHARITIS: Status: ACTIVE | Noted: 2022-02-21

## 2022-03-09 RX ORDER — ESCITALOPRAM OXALATE 10 MG/1
10 TABLET ORAL DAILY
Qty: 90 TABLET | Refills: 0 | Status: SHIPPED | OUTPATIENT
Start: 2022-03-09

## 2022-03-09 NOTE — TELEPHONE ENCOUNTER
Patient requesting refill on Escitalopram, advised she would need appointment, Stephen Rueda had requested 3 month follow up. Patient declined to schedule.  Patient states she only has 3 days left, requesting refill or she will start going through withdrawal

## 2022-03-11 ENCOUNTER — LAB ENCOUNTER (OUTPATIENT)
Dept: LAB | Age: 62
End: 2022-03-11
Attending: OTOLARYNGOLOGY
Payer: COMMERCIAL

## 2022-03-11 DIAGNOSIS — E07.9 THYROID DISORDER: ICD-10-CM

## 2022-03-11 DIAGNOSIS — E07.9 THYROID DYSFUNCTION: ICD-10-CM

## 2022-03-11 DIAGNOSIS — E04.1 THYROID NODULE: ICD-10-CM

## 2022-03-11 LAB
T4 FREE SERPL-MCNC: 1.1 NG/DL (ref 0.8–1.7)
THYROGLOB SERPL-MCNC: <15 U/ML (ref ?–60)
THYROPEROXIDASE AB SERPL-ACNC: 32 U/ML (ref ?–60)
TSI SER-ACNC: 1.27 MIU/ML (ref 0.36–3.74)

## 2022-03-11 PROCEDURE — 36415 COLL VENOUS BLD VENIPUNCTURE: CPT

## 2022-03-11 PROCEDURE — 86800 THYROGLOBULIN ANTIBODY: CPT

## 2022-03-11 PROCEDURE — 86376 MICROSOMAL ANTIBODY EACH: CPT

## 2022-03-11 PROCEDURE — 84443 ASSAY THYROID STIM HORMONE: CPT

## 2022-03-11 PROCEDURE — 84439 ASSAY OF FREE THYROXINE: CPT

## 2022-04-04 ENCOUNTER — TELEPHONE (OUTPATIENT)
Dept: FAMILY MEDICINE CLINIC | Facility: CLINIC | Age: 62
End: 2022-04-04

## 2022-04-04 NOTE — TELEPHONE ENCOUNTER
Refilled as #90 on 3/9. Per [place] dispense [place] patient was given #30. To contact pharmacy for remaining tablets. Left message on machine for patient to call back.

## 2022-04-04 NOTE — TELEPHONE ENCOUNTER
Pt is completely out of her Lexapro please send to [facility] [address] she has an appt on 4/12/22 please send enough to get to appt

## 2022-05-02 ENCOUNTER — TELEPHONE (OUTPATIENT)
Dept: FAMILY MEDICINE CLINIC | Facility: CLINIC | Age: 62
End: 2022-05-02

## 2022-05-03 NOTE — TELEPHONE ENCOUNTER
Pt states wants to get off the Lexapro. Med started 12/2021  Pt states she does not like how she feels on med- no energy, weight gain  Pt states she is doing well and feels like she can be off medication.  Please advise

## 2022-06-03 ENCOUNTER — TELEMEDICINE (OUTPATIENT)
Dept: FAMILY MEDICINE CLINIC | Facility: CLINIC | Age: 62
End: 2022-06-03

## 2022-06-03 DIAGNOSIS — U07.1 COVID-19 VIRUS INFECTION: Primary | ICD-10-CM

## 2022-07-05 ENCOUNTER — TELEPHONE (OUTPATIENT)
Dept: FAMILY MEDICINE CLINIC | Facility: CLINIC | Age: 62
End: 2022-07-05

## 2022-07-06 NOTE — TELEPHONE ENCOUNTER
Last cologuard completed 5/3/21 and result was negative. Not due for 3 years from completion date. Left message on machine for patient to call back.

## 2022-07-07 NOTE — TELEPHONE ENCOUNTER
Routed to Dr. Damon Sotomayor     Pt requesting to repeat Cologuard. Last done 5/2021. [id number] for repeat?

## 2022-07-08 PROBLEM — H04.123 DRY EYE SYNDROME OF BILATERAL LACRIMAL GLANDS: Status: ACTIVE | Noted: 2022-07-08

## 2022-07-08 NOTE — TELEPHONE ENCOUNTER
Ok to order if pt insists but she should call insurance, I would not expect them to cover. If she has new concerns, the better test for colon cancer would be a colonoscopy.   Can send to [facility] she wishes

## 2022-09-04 NOTE — TELEPHONE ENCOUNTER
Addressed in result note
Patient had an MRI on her neck and it was found that there was a lesion on her thyroid, it was noted she can have an US, wondering if she needs one.
Per yesterday's MRI Cervical Spine results:    3. Tiny nodular lesion in the right lobe of the thyroid gland. Please correlate clinically. If indicated, further characterization with thyroid ultrasound could be performed. Routed to Marisel Power PA-C - do you want pt to have US?
pt c/o neck pain radiating down left arm/tingling. right lower leg pain, headache denies cp/sobN/v/d/f. all started today. Hx HBP

## 2022-09-07 LAB — AMB EXT COLOGUARD RESULT: NEGATIVE

## 2022-10-13 RX ORDER — SPIRONOLACTONE 50 MG/1
100 TABLET, FILM COATED ORAL DAILY
COMMUNITY
Start: 2022-09-12

## 2025-02-05 ENCOUNTER — PATIENT MESSAGE (OUTPATIENT)
Dept: FAMILY MEDICINE CLINIC | Facility: CLINIC | Age: 65
End: 2025-02-05

## 2025-02-06 NOTE — TELEPHONE ENCOUNTER
LOV 6/3/22- televisit for Covid  Last visit prior to that was 12/30/21  Advised Pt that we are unable to sign off on any PT without an appointment. Advise Pt to schedule an appointment.

## (undated) NOTE — LETTER
[address] [place] [address] Way   Date:   10/20/2021     Name:   Gene Majestic    YOB: 1960   MRN:   [id number]       WHERE IS YOUR PAIN NOW?   Robert Sanabria the areas on your body where you feel the described

## (undated) NOTE — LETTER
Patient Name: Soren Leal  YOB: 1960          MRN :  [id number]  Date:  10/22/2021  Referring Physician:  Preeti Estrada    ProgressSummary  Pt has attended 4 visits in Physical Therapy.      Diagnosis: Scoliosis (M41.9)  Insurance (Auth sessions.     Objective:     Observation/Posture: mild fwd head, rounded shoulders posture, L lumbar R thoracic scoliosis with associated prominences associated with rotary component, tendency to deviate head/neck to R     Gait: good gait mechanics     Dorys Barriga least 4/5 for improved stability with daily activity and exercise - improved  4. Pt will report improved stability with ambulation and functional mobility with at least 50% decrease in feeling \"off balance\" - improved  5.  Pt will demo improved breathing

## (undated) NOTE — LETTER
[address] [place] [address] Way   Date:   10/29/2021     Name:   Paris Edwards    YOB: 1960   MRN:   [id number]       WHERE IS YOUR PAIN NOW?   Milena Wheeler the areas on your body where you feel the described